# Patient Record
Sex: FEMALE | Race: WHITE | NOT HISPANIC OR LATINO | Employment: UNEMPLOYED | URBAN - NONMETROPOLITAN AREA
[De-identification: names, ages, dates, MRNs, and addresses within clinical notes are randomized per-mention and may not be internally consistent; named-entity substitution may affect disease eponyms.]

---

## 2020-07-13 ENCOUNTER — OFFICE VISIT (OUTPATIENT)
Dept: CARDIOLOGY | Facility: CLINIC | Age: 35
End: 2020-07-13

## 2020-07-13 VITALS
TEMPERATURE: 98 F | HEART RATE: 58 BPM | BODY MASS INDEX: 40.27 KG/M2 | WEIGHT: 174 LBS | HEIGHT: 55 IN | SYSTOLIC BLOOD PRESSURE: 98 MMHG | DIASTOLIC BLOOD PRESSURE: 60 MMHG

## 2020-07-13 DIAGNOSIS — R01.1 MURMUR, CARDIAC: ICD-10-CM

## 2020-07-13 DIAGNOSIS — E03.9 HYPOTHYROIDISM, UNSPECIFIED TYPE: ICD-10-CM

## 2020-07-13 DIAGNOSIS — R07.2 PRECORDIAL PAIN: ICD-10-CM

## 2020-07-13 DIAGNOSIS — G47.30 SLEEP APNEA IN ADULT: ICD-10-CM

## 2020-07-13 DIAGNOSIS — R06.02 SHORTNESS OF BREATH: Primary | ICD-10-CM

## 2020-07-13 DIAGNOSIS — E66.01 MORBIDLY OBESE (HCC): ICD-10-CM

## 2020-07-13 PROCEDURE — 99204 OFFICE O/P NEW MOD 45 MIN: CPT | Performed by: INTERNAL MEDICINE

## 2020-07-13 PROCEDURE — 93000 ELECTROCARDIOGRAM COMPLETE: CPT | Performed by: INTERNAL MEDICINE

## 2020-07-13 RX ORDER — MONTELUKAST SODIUM 10 MG/1
10 TABLET ORAL NIGHTLY
COMMUNITY
End: 2022-01-27

## 2020-07-13 RX ORDER — CLONAZEPAM 0.5 MG/1
0.5 TABLET ORAL AS NEEDED
COMMUNITY
End: 2022-01-27

## 2020-07-13 RX ORDER — QUETIAPINE FUMARATE 25 MG/1
TABLET, FILM COATED ORAL
COMMUNITY
End: 2022-01-27

## 2020-07-13 RX ORDER — LORATADINE 10 MG/1
CAPSULE, LIQUID FILLED ORAL DAILY
COMMUNITY
End: 2021-01-19

## 2020-07-13 RX ORDER — CHOLECALCIFEROL (VITAMIN D3) 125 MCG
CAPSULE ORAL
COMMUNITY
End: 2022-01-27

## 2020-07-13 RX ORDER — ERGOCALCIFEROL 1.25 MG/1
50000 CAPSULE ORAL WEEKLY
COMMUNITY
End: 2022-01-27

## 2020-07-13 RX ORDER — FLUOXETINE 10 MG/1
10 CAPSULE ORAL DAILY
COMMUNITY
End: 2022-01-27

## 2020-07-13 RX ORDER — LEVOTHYROXINE SODIUM 0.1 MG/1
100 TABLET ORAL DAILY
COMMUNITY

## 2020-07-13 NOTE — PROGRESS NOTES
Chief Complaint   Patient presents with   • Establish Care     re-establishing care, last seen here in 2008, heart murmur, chest pain, SOB   • Chest Pain     with exertion, mother reports walking about a mile several times a week, pt reports chest pain and SOB.  Symptoms resolve after resting.    • Shortness of Breath     with exertion   • Cardiac history     echo here in 2008, will be in door.    • Labs     most recent from June in chart   • Weight Gain     mother concerned about her weight gain of 15 pounds, is on a diet and has increased her exercise. Has lost 2 lbs over the past month.    • Sleep Apnea     was diagnosed several years ago, mother reports she complains of chest pain every time she tries to wear the CPAP.    • Aspirin     does not take a daily aspirin        CARDIAC COMPLAINTS  chest pressure/discomfort, dyspnea and fatigue      Subjective   Ryann Ortega is a 34 y.o. female came in today with the family.  She has history of Down syndrome whom I seen in 2003 and later in 2008.  The first time I saw her for weakness and shortness of breath associated with some dizziness and lightheadedness.  Her echocardiogram showed normal LV function, possible bicuspid aortic valve and intermittent prolapse of the mitral valve.  At that time she did have some episodes of syncope and felt it was related to Topamax and stopped taking the medication.  She has not had any more syncope.  She had symptoms of sleep apnea at that time and I checked her nocturnal pulse PO2 measurement.  She was diagnosed with sleep apnea but she was not able to tolerate the CPAP machine.  She is now referred back after 12 years because of increasing chest pain and shortness of breath.  According to the mother the try to walk about a mile a day.  She now started reporting chest pain during the walk and she has to sit down or rest for few minutes before the symptoms get better.  She is not able to describe the chest pain.  She does point to  her chest and apparently it does radiate to the left shoulder.  She also started noticing increasing shortness of breath during that time.  She also has been gaining weight and apparently gained about 15 pounds in the last few months.  Since I have seen she has been diagnosed with thyroid problem and takes thyroid supplements.  She did undergo some lab work in June and found to have a cholesterol of 154 with the LDL of 83.  Her blood sugar was normal with normal electrolytes.  Her TSH was normal at 1.21.  Her blood count was within normal limits.  She has no history of smoking.  Her brother has some kind of heart disease.  Hypertension hypercholesterolemia does run in the family.    Past Surgical History:   Procedure Laterality Date   • ECHO - CONVERTED  04/15/2008    EF 60%. Bicuspid AV, Redudant MV, Mild MR. RVSP- 35 mmHg.       Current Outpatient Medications   Medication Sig Dispense Refill   • clonazePAM (KlonoPIN) 0.5 MG tablet Take 0.5 mg by mouth As Needed for Seizures.     • FLUoxetine (PROzac) 10 MG capsule Take 10 mg by mouth Daily.     • levothyroxine (SYNTHROID, LEVOTHROID) 100 MCG tablet Take 100 mcg by mouth Daily.     • Loratadine 10 MG capsule Take  by mouth Daily.     • melatonin 5 MG tablet tablet 2 tabs at night     • montelukast (SINGULAIR) 10 MG tablet Take 10 mg by mouth Every Night.     • QUEtiapine (SEROquel) 25 MG tablet 1/2 to 1 tab nightly prn     • vitamin D (ERGOCALCIFEROL) 1.25 MG (18780 UT) capsule capsule Take 50,000 Units by mouth 1 (One) Time Per Week.       No current facility-administered medications for this visit.            ALLERGIES:  Patient has no known allergies.    Past Medical History:   Diagnosis Date   • Anxiety and depression    • Asthma    • Down's syndrome    • Femur fracture, left (CMS/HCC)     s/p surgical repair   • Heart murmur    • History of tonsillectomy and adenoidectomy    • Hypothyroidism    • Seasonal allergies    • Sleep apnea     noncompliant wearing  "mask   • Vitamin D deficiency        Social History     Tobacco Use   Smoking Status Never Smoker   Smokeless Tobacco Never Used          Family History   Problem Relation Age of Onset   • Stroke Mother    • Hypertension Mother    • Hyperlipidemia Mother    • Rheum arthritis Father    • Hyperlipidemia Father    • Hypertension Father    • Hypertension Brother    • Hypothyroidism Brother    • Other Maternal Grandmother          at 20 years old with pneumonia   • Heart disease Maternal Grandfather    • Hyperlipidemia Maternal Grandfather    • Hypertension Maternal Grandfather    • Diabetes Maternal Grandfather    • Diabetes Paternal Grandmother    • Other Paternal Grandfather         history unknown   • Heart disease Brother        Review of Systems   Constitution: Positive for malaise/fatigue and weight gain. Negative for decreased appetite.   HENT: Negative for congestion and sore throat.    Eyes: Negative for blurred vision.   Cardiovascular: Positive for chest pain and dyspnea on exertion.   Respiratory: Positive for shortness of breath and snoring.    Endocrine: Negative for cold intolerance and heat intolerance.   Hematologic/Lymphatic: Negative for adenopathy. Does not bruise/bleed easily.   Skin: Negative for itching, nail changes and skin cancer.   Musculoskeletal: Negative for arthritis and myalgias.   Gastrointestinal: Negative for abdominal pain, dysphagia and heartburn.   Genitourinary: Negative for bladder incontinence and frequency.   Neurological: Negative for dizziness, light-headedness, seizures and vertigo.   Psychiatric/Behavioral: Negative for altered mental status.   Allergic/Immunologic: Negative for environmental allergies and hives.       Diabetes- No  Thyroid- abnormal    Objective     BP 98/60 (BP Location: Left arm)   Pulse 58   Temp 98 °F (36.7 °C)   Ht 139.7 cm (55\")   Wt 78.9 kg (174 lb)   BMI 40.44 kg/m²     Physical Exam   Constitutional: She is oriented to person, place, and " time. She appears well-developed and well-nourished.   HENT:   Head: Normocephalic.   Nose: Nose normal.   Eyes: Pupils are equal, round, and reactive to light. EOM are normal.   Neck: Normal range of motion. Neck supple.   Cardiovascular: Normal rate, regular rhythm, S1 normal and S2 normal.   Murmur heard.  Pulmonary/Chest: Effort normal and breath sounds normal.   Abdominal: Soft. Bowel sounds are normal.   Musculoskeletal: Normal range of motion. She exhibits edema.   Neurological: She is alert and oriented to person, place, and time.   Skin: Skin is warm and dry.   Psychiatric: She has a normal mood and affect.         ECG 12 Lead  Date/Time: 7/13/2020 12:59 PM  Performed by: Bandar Valdes MD  Authorized by: Bandar Valdes MD   Comparison: compared with previous ECG from 5/8/2008  Comparison to previous ECG: Smaller Qrs  Rhythm: sinus rhythm  Rate: normal  QRS axis: normal  Other findings: non-specific ST-T wave changes and low voltage    Clinical impression: non-specific ECG              Assessment/Plan   Patient's Body mass index is 40.44 kg/m². BMI is above normal parameters. Recommendations include: educational material, exercise counseling and nutrition counseling.     Ryann was seen today for establish care, chest pain, shortness of breath, cardiac history, labs, weight gain, sleep apnea and aspirin.    Diagnoses and all orders for this visit:    Shortness of breath  -     Adult Transthoracic Echo Complete W/ Cont if Necessary Per Protocol; Future    Precordial pain  -     Stress Test With Myocardial Perfusion One Day; Future    Morbidly obese (CMS/HCC)    Hypothyroidism, unspecified type    Murmur, cardiac    Sleep apnea in adult  -     Overnight Sleep Oximetry Study; Future    At baseline she is slightly bradycardic and mildly hypotensive.  Her EKG done today shows sinus bradycardia, small QRS complex, nonspecific ST-T changes.  Her clinical examination reveals a BMI of 40.  She does have  normal heart sounds, questionable ejection click and a short systolic murmur at the mitral as well as at the aortic area.  She has normal peripheral pulse and trace pedal edema.    Her shortness of breath which is the main complaint could be related to her lungs or due to her obesity itself.  Given her history of valvular heart disease, I cannot rule out cardiac cause.  I scheduled her to undergo an echocardiogram to evaluate the LV function, valvular structures, PA pressure and also to rule out any pericardial effusion    Regarding the chest pain, it is little difficult to obtain a good history from her.  She does have few risk factor for CAD.  I did schedule her to undergo a stress test.  Since she is not able to walk much, I scheduled it as a Lexiscan    Regarding the obesity, I had a long talk with the mother about the diet.  I talked to her about cutting down on the carbohydrate intake.  I gave her papers on Mediterranean diet    Regarding the hypothyroidism, which has been newly diagnosed, it appears to be very well controlled with supplements so we will continue the same    Regarding the sleep apnea, I would like to recheck her oxygen again.  Even if she is not able to tolerate the CPAP, if there is significant hypoxia she might benefit with nocturnal PO2 supplements    Based on the results, further recommendations will be made.               Electronically signed by Bandar Valdes MD July 13, 2020 12:39

## 2020-07-13 NOTE — PATIENT INSTRUCTIONS
Mediterranean Diet  A Mediterranean diet refers to food and lifestyle choices that are based on the traditions of countries located on the [place]. This way of eating has been shown to help prevent certain conditions and improve outcomes for people who have chronic diseases, like kidney disease and heart disease.  What are tips for following this plan?  Lifestyle  · Cook and eat meals together with your family, when possible.  · Drink enough fluid to keep your urine clear or pale yellow.  · Be physically active every day. This includes:  ? Aerobic exercise like running or swimming.  ? Leisure activities like gardening, walking, or housework.  · Get 7-8 hours of sleep each night.  · If recommended by your health care provider, drink red wine in moderation. This means 1 glass a day for nonpregnant women and 2 glasses a day for men. A glass of wine equals 5 oz (150 mL).  Reading food labels    · Check the serving size of packaged foods. For foods such as rice and pasta, the serving size refers to the amount of cooked product, not dry.  · Check the total fat in packaged foods. Avoid foods that have saturated fat or trans fats.  · Check the ingredients list for added sugars, such as corn syrup.  Shopping  · At the grocery store, buy most of your food from the areas near the walls of the store. This includes:  ? Fresh fruits and vegetables (produce).  ? Grains, beans, nuts, and seeds. Some of these may be available in unpackaged forms or large amounts (in bulk).  ? Fresh seafood.  ? Poultry and eggs.  ? Low-fat dairy products.  · Buy whole ingredients instead of prepackaged foods.  · Buy fresh fruits and vegetables in-season from local farmers markets.  · Buy frozen fruits and vegetables in resealable bags.  · If you do not have access to quality fresh seafood, buy precooked frozen shrimp or canned fish, such as tuna, salmon, or sardines.  · Buy small amounts of raw or cooked vegetables, salads, or olives from  the deli or salad bar at your store.  · Stock your pantry so you always have certain foods on hand, such as olive oil, canned tuna, canned tomatoes, rice, pasta, and beans.  Cooking  · Cook foods with extra-virgin olive oil instead of using butter or other vegetable oils.  · Have meat as a side dish, and have vegetables or grains as your main dish. This means having meat in small portions or adding small amounts of meat to foods like pasta or stew.  · Use beans or vegetables instead of meat in common dishes like chili or lasagna.  · DISH with different cooking methods. Try roasting or broiling vegetables instead of steaming or sautéeing them.  · Add frozen vegetables to soups, stews, pasta, or rice.  · Add nuts or seeds for added healthy fat at each meal. You can add these to yogurt, salads, or vegetable dishes.  · Marinate fish or vegetables using olive oil, lemon juice, garlic, and fresh herbs.  Meal planning    · Plan to eat 1 vegetarian meal one day each week. Try to work up to 2 vegetarian meals, if possible.  · Eat seafood 2 or more times a week.  · Have healthy snacks readily available, such as:  ? Vegetable sticks with hummus.  ? Greek yogurt.  ? Fruit and nut trail mix.  · Eat balanced meals throughout the week. This includes:  ? Fruit: 2-3 servings a day  ? Vegetables: 4-5 servings a day  ? Low-fat dairy: 2 servings a day  ? Fish, poultry, or lean meat: 1 serving a day  ? Beans and legumes: 2 or more servings a week  ? Nuts and seeds: 1-2 servings a day  ? Whole grains: 6-8 servings a day  ? Extra-virgin olive oil: 3-4 servings a day  · Limit red meat and sweets to only a few servings a month  What are my food choices?  · Mediterranean diet  ? Recommended  § Grains: Whole-grain pasta. Brown rice. Bulgar wheat. Polenta. Couscous. Whole-wheat bread. Oatmeal. Quinoa.  § Vegetables: Artichokes. Beets. Broccoli. Cabbage. Carrots. Eggplant. Green beans. Chard. Kale. Spinach. Onions. Leeks. Peas. Squash.  Tomatoes. Peppers. Radishes.  § Fruits: Apples. Apricots. Avocado. Berries. Bananas. Cherries. Dates. Figs. Grapes. Rojas. Melon. Oranges. Peaches. Plums. Pomegranate.  § Meats and other protein foods: Beans. Almonds. Sunflower seeds. Pine nuts. Peanuts. Cod. [place]. Scallops. Shrimp. Tuna. Tilapia. Clams. Oysters. Eggs.  § Dairy: Low-fat milk. Cheese. Greek yogurt.  § Beverages: Water. Red wine. Herbal tea.  § Fats and oils: Extra virgin olive oil. Avocado oil. Grape seed oil.  § Sweets and desserts: Greek yogurt with honey. Baked apples. Poached pears. Trail mix.  § Seasoning and other foods: Basil. Cilantro. Coriander. Cumin. Mint. Parsley. Brandan. Rosemary. Tarragon. Garlic. Oregano. Thyme. Pepper. Balsalmic vinegar. Tahini. Hummus. Tomato sauce. Olives. Mushrooms.  ? Limit these  § Grains: Prepackaged pasta or rice dishes. Prepackaged cereal with added sugar.  § Vegetables: Deep fried potatoes (french fries).  § Fruits: Fruit canned in syrup.  § Meats and other protein foods: Beef. Pork. Lamb. Poultry with skin. Hot dogs. Dash.  § Dairy: Ice cream. Sour cream. Whole milk.  § Beverages: Juice. Sugar-sweetened soft drinks. Beer. Liquor and spirits.  § Fats and oils: Butter. Canola oil. Vegetable oil. Beef fat (tallow). Lard.  § Sweets and desserts: Cookies. Cakes. Pies. Candy.  § Seasoning and other foods: Mayonnaise. Premade sauces and marinades.  The items listed may not be a complete list. Talk with your dietitian about what dietary choices are right for you.  Summary  · The Mediterranean diet includes both food and lifestyle choices.  · Eat a variety of fresh fruits and vegetables, beans, nuts, seeds, and whole grains.  · Limit the amount of red meat and sweets that you eat.  · Talk with your health care provider about whether it is safe for you to drink red wine in moderation. This means 1 glass a day for nonpregnant women and 2 glasses a day for men. A glass of wine equals 5 oz (150 mL).  This information  is not intended to replace advice given to you by your health care provider. Make sure you discuss any questions you have with your health care provider.  Document Released: 08/10/2017 Document Revised: 08/17/2017 Document Reviewed: 08/10/2017  Elsevier Patient Education © 2020 [employer]

## 2020-07-14 ENCOUNTER — HOSPITAL ENCOUNTER (OUTPATIENT)
Dept: CARDIOLOGY | Facility: HOSPITAL | Age: 35
Discharge: HOME | End: 2020-07-14

## 2020-07-14 VITALS — WEIGHT: 173.94 LBS | BODY MASS INDEX: 40.26 KG/M2 | HEIGHT: 55 IN

## 2020-07-14 DIAGNOSIS — R06.02 SHORTNESS OF BREATH: ICD-10-CM

## 2020-07-14 DIAGNOSIS — R07.2 PRECORDIAL PAIN: ICD-10-CM

## 2020-07-14 LAB
AORTIC DIMENSIONLESS INDEX: 0.8 (DI)
BH CV ECHO MEAS - AO MAX PG (FULL): 1.7 MMHG
BH CV ECHO MEAS - AO MAX PG: 4 MMHG
BH CV ECHO MEAS - AO MEAN PG (FULL): 1 MMHG
BH CV ECHO MEAS - AO MEAN PG: 2 MMHG
BH CV ECHO MEAS - AO ROOT AREA (BSA CORRECTED): 1.7
BH CV ECHO MEAS - AO ROOT AREA: 6.2 CM^2
BH CV ECHO MEAS - AO ROOT DIAM: 2.8 CM
BH CV ECHO MEAS - AO V2 MAX: 100 CM/SEC
BH CV ECHO MEAS - AO V2 MEAN: 67.1 CM/SEC
BH CV ECHO MEAS - AO V2 VTI: 24.4 CM
BH CV ECHO MEAS - BSA(HAYCOCK): 1.8 M^2
BH CV ECHO MEAS - BSA: 1.7 M^2
BH CV ECHO MEAS - BZI_BMI: 39 KILOGRAMS/M^2
BH CV ECHO MEAS - BZI_METRIC_HEIGHT: 142.2 CM
BH CV ECHO MEAS - BZI_METRIC_WEIGHT: 78.9 KG
BH CV ECHO MEAS - EDV(CUBED): 78.4 ML
BH CV ECHO MEAS - EDV(TEICH): 82.2 ML
BH CV ECHO MEAS - EF(CUBED): 66.9 %
BH CV ECHO MEAS - EF(TEICH): 58.8 %
BH CV ECHO MEAS - ESV(CUBED): 25.9 ML
BH CV ECHO MEAS - ESV(TEICH): 33.9 ML
BH CV ECHO MEAS - FS: 30.8 %
BH CV ECHO MEAS - IVS/LVPW: 1.3
BH CV ECHO MEAS - IVSD: 0.9 CM
BH CV ECHO MEAS - LA DIMENSION: 3.2 CM
BH CV ECHO MEAS - LA/AO: 1.1
BH CV ECHO MEAS - LAT PEAK E' VEL: 13.7 CM/SEC
BH CV ECHO MEAS - LV IVRT: 0.1 SEC
BH CV ECHO MEAS - LV MASS(C)D: 105.3 GRAMS
BH CV ECHO MEAS - LV MASS(C)DI: 62.9 GRAMS/M^2
BH CV ECHO MEAS - LV MAX PG: 2.3 MMHG
BH CV ECHO MEAS - LV MEAN PG: 1 MMHG
BH CV ECHO MEAS - LV V1 MAX: 75.7 CM/SEC
BH CV ECHO MEAS - LV V1 MEAN: 48.5 CM/SEC
BH CV ECHO MEAS - LV V1 VTI: 18.5 CM
BH CV ECHO MEAS - LVIDD: 4.3 CM
BH CV ECHO MEAS - LVIDS: 3 CM
BH CV ECHO MEAS - LVPWD: 0.71 CM
BH CV ECHO MEAS - MED PEAK E' VEL: 9.6 CM/SEC
BH CV ECHO MEAS - MV A MAX VEL: 51.8 CM/SEC
BH CV ECHO MEAS - MV DEC SLOPE: 326 CM/SEC^2
BH CV ECHO MEAS - MV DEC TIME: 0.26 SEC
BH CV ECHO MEAS - MV E MAX VEL: 93.9 CM/SEC
BH CV ECHO MEAS - MV E/A: 1.8
BH CV ECHO MEAS - MV MAX PG: 3.9 MMHG
BH CV ECHO MEAS - MV MEAN PG: 1 MMHG
BH CV ECHO MEAS - MV P1/2T MAX VEL: 91.8 CM/SEC
BH CV ECHO MEAS - MV P1/2T: 82.5 MSEC
BH CV ECHO MEAS - MV V2 MAX: 99 CM/SEC
BH CV ECHO MEAS - MV V2 MEAN: 43.5 CM/SEC
BH CV ECHO MEAS - MV V2 VTI: 34 CM
BH CV ECHO MEAS - MVA P1/2T LCG: 2.4 CM^2
BH CV ECHO MEAS - MVA(P1/2T): 2.7 CM^2
BH CV ECHO MEAS - PA MAX PG (FULL): 1.7 MMHG
BH CV ECHO MEAS - PA MAX PG: 3.9 MMHG
BH CV ECHO MEAS - PA MEAN PG (FULL): 1 MMHG
BH CV ECHO MEAS - PA MEAN PG: 2 MMHG
BH CV ECHO MEAS - PA V2 MAX: 99 CM/SEC
BH CV ECHO MEAS - PA V2 MEAN: 68.5 CM/SEC
BH CV ECHO MEAS - PA V2 VTI: 20.9 CM
BH CV ECHO MEAS - RV MAX PG: 2.2 MMHG
BH CV ECHO MEAS - RV MEAN PG: 1 MMHG
BH CV ECHO MEAS - RV V1 MAX: 73.8 CM/SEC
BH CV ECHO MEAS - RV V1 MEAN: 51.9 CM/SEC
BH CV ECHO MEAS - RV V1 VTI: 19.1 CM
BH CV ECHO MEAS - RVDD: 2.1 CM
BH CV ECHO MEAS - SI(AO): 89.8 ML/M^2
BH CV ECHO MEAS - SI(CUBED): 31.4 ML/M^2
BH CV ECHO MEAS - SI(TEICH): 28.9 ML/M^2
BH CV ECHO MEAS - SV(AO): 150.2 ML
BH CV ECHO MEAS - SV(CUBED): 52.5 ML
BH CV ECHO MEAS - SV(TEICH): 48.3 ML
BH CV ECHO MEASUREMENTS AVERAGE E/E' RATIO: 8.06
BH CV NUCLEAR PRIOR STUDY: 2
BH CV STRESS COMMENTS STAGE 1: NORMAL
BH CV STRESS DOSE REGADENOSON STAGE 1: 0.4
BH CV STRESS DURATION MIN STAGE 1: 0
BH CV STRESS DURATION SEC STAGE 1: 10
BH CV STRESS PROTOCOL 1: NORMAL
BH CV STRESS RECOVERY BP: NORMAL MMHG
BH CV STRESS RECOVERY HR: 111 BPM
BH CV STRESS STAGE 1: 1
LV EF NUC BP: 51 %
MAXIMAL PREDICTED HEART RATE: 188 BPM
MAXIMAL PREDICTED HEART RATE: 188 BPM
PERCENT MAX PREDICTED HR: 69.15 %
STRESS BASELINE BP: NORMAL MMHG
STRESS BASELINE HR: 76 BPM
STRESS PERCENT HR: 81 %
STRESS POST PEAK BP: NORMAL MMHG
STRESS POST PEAK HR: 130 BPM
STRESS TARGET HR: 160 BPM
STRESS TARGET HR: 160 BPM

## 2020-07-14 PROCEDURE — 25010000002 REGADENOSON 0.4 MG/5ML SOLUTION: Performed by: INTERNAL MEDICINE

## 2020-07-14 PROCEDURE — 0 TECHNETIUM SESTAMIBI: Performed by: INTERNAL MEDICINE

## 2020-07-14 PROCEDURE — 93306 TTE W/DOPPLER COMPLETE: CPT

## 2020-07-14 PROCEDURE — 93016 CV STRESS TEST SUPVJ ONLY: CPT | Performed by: NURSE PRACTITIONER

## 2020-07-14 PROCEDURE — 93306 TTE W/DOPPLER COMPLETE: CPT | Performed by: INTERNAL MEDICINE

## 2020-07-14 PROCEDURE — 93018 CV STRESS TEST I&R ONLY: CPT | Performed by: INTERNAL MEDICINE

## 2020-07-14 PROCEDURE — 93356 MYOCRD STRAIN IMG SPCKL TRCK: CPT | Performed by: INTERNAL MEDICINE

## 2020-07-14 PROCEDURE — A9500 TC99M SESTAMIBI: HCPCS | Performed by: INTERNAL MEDICINE

## 2020-07-14 PROCEDURE — 78452 HT MUSCLE IMAGE SPECT MULT: CPT

## 2020-07-14 PROCEDURE — 93017 CV STRESS TEST TRACING ONLY: CPT

## 2020-07-14 PROCEDURE — 78452 HT MUSCLE IMAGE SPECT MULT: CPT | Performed by: INTERNAL MEDICINE

## 2020-07-14 RX ADMIN — TECHNETIUM TC 99M SESTAMIBI 1 DOSE: 1 INJECTION INTRAVENOUS at 08:59

## 2020-07-14 RX ADMIN — REGADENOSON 0.4 MG: 0.08 INJECTION, SOLUTION INTRAVENOUS at 12:00

## 2020-07-14 RX ADMIN — TECHNETIUM TC 99M SESTAMIBI 1 DOSE: 1 INJECTION INTRAVENOUS at 12:00

## 2021-01-19 ENCOUNTER — OFFICE VISIT (OUTPATIENT)
Dept: CARDIOLOGY | Facility: CLINIC | Age: 36
End: 2021-01-19

## 2021-01-19 VITALS
HEART RATE: 68 BPM | TEMPERATURE: 97.8 F | BODY MASS INDEX: 39.57 KG/M2 | WEIGHT: 171 LBS | SYSTOLIC BLOOD PRESSURE: 88 MMHG | DIASTOLIC BLOOD PRESSURE: 58 MMHG | HEIGHT: 55 IN

## 2021-01-19 DIAGNOSIS — E03.9 HYPOTHYROIDISM, UNSPECIFIED TYPE: Primary | ICD-10-CM

## 2021-01-19 DIAGNOSIS — E66.01 MORBIDLY OBESE (HCC): ICD-10-CM

## 2021-01-19 DIAGNOSIS — G47.30 SLEEP APNEA IN ADULT: ICD-10-CM

## 2021-01-19 DIAGNOSIS — R00.2 PALPITATIONS: ICD-10-CM

## 2021-01-19 PROCEDURE — 99213 OFFICE O/P EST LOW 20 MIN: CPT | Performed by: NURSE PRACTITIONER

## 2021-01-19 RX ORDER — CETIRIZINE HYDROCHLORIDE 10 MG/1
10 TABLET ORAL DAILY
COMMUNITY

## 2021-01-19 NOTE — PROGRESS NOTES
"Chief Complaint   Patient presents with   • Follow-up     Cardiac management.   • Lab     Last labs couple weeks ago per PCP. PCP writes refills on medications.   • Palpitations     Has occasional flutters, states \"I feel nervous at times\".   • Medication     She started taking [employer] healthy metabolism support a week ago, has lost 5 lbs.     Subjective       Ryann Ortega is a 35 y.o. female with Down Syndrome who was seen in 2003 and 2008 for weakness and shortness of breath.  Echocardiogram showed normal LV function and possible bicuspid AV and intermittent prolapse.  Topamax was stopped and she had no recurrent syncope.  Overnight oximetry was abnormal but she was unable to tolerate CPAP.  She was diagnosed with hypothyroidism which has been corrected.  She was referred back for evaluation in July 2020 for evaluation of increasing chest pain and shortness of breath.  She was trying to exercise and developed shortness of breath and chest tightness requiring her to stop and rest.  Labs showed normal cholesterol at 154, LDL 83, normal glucose, TSH 1.21, normal H/H.  She underwent cardiac work-up with Lexiscan stress showing breast attenuation without ischemia. Echo showed stable LV function, EF 60%, trace MR, no aortic stenosis.  The aortic valve was not well visualized.  Conservative management recommended.    She returns today for her follow-up visit with her mother.  She is pleasant, in no distress.  She denies chest pain or shortness of breath.  She does have occasional fluttering in her chest when she feels anxious or nervous.  No dizziness or syncope.  She is watching her diet and has lost a couple of pounds.         Cardiac History:    Past Surgical History:   Procedure Laterality Date   • CARDIOVASCULAR STRESS TEST  07/14/2020    Lexiscan Poor Quality. . Breast Attenuation.   • ECHO - CONVERTED  04/15/2008    EF 60%. Bicuspid AV, Redudant MV, Mild MR. RVSP- 35 mmHg.   • ECHO - CONVERTED  07/14/2020 "    TLS. EF 55%. Unable to see the AV. Trace-Mild MR.     Current Outpatient Medications   Medication Sig Dispense Refill   • cetirizine (zyrTEC) 10 MG tablet Take 10 mg by mouth Daily.     • clonazePAM (KlonoPIN) 0.5 MG tablet Take 0.5 mg by mouth As Needed for Seizures.     • FLUoxetine (PROzac) 10 MG capsule Take 10 mg by mouth Daily.     • levothyroxine (SYNTHROID, LEVOTHROID) 100 MCG tablet Take 100 mcg by mouth Daily.     • melatonin 5 MG tablet tablet 2 tabs at night     • montelukast (SINGULAIR) 10 MG tablet Take 10 mg by mouth Every Night.     • QUEtiapine (SEROquel) 25 MG tablet 1/2 to 1 tab nightly prn     • vitamin D (ERGOCALCIFEROL) 1.25 MG (72344 UT) capsule capsule Take 50,000 Units by mouth 1 (One) Time Per Week.       No current facility-administered medications for this visit.      Patient has no known allergies.    Past Medical History:   Diagnosis Date   • Anxiety and depression    • Asthma    • Down's syndrome    • Femur fracture, left (CMS/HCC)     s/p surgical repair   • Heart murmur    • History of tonsillectomy and adenoidectomy    • Hypothyroidism    • Seasonal allergies    • Sleep apnea     noncompliant wearing mask   • Vitamin D deficiency      Social History     Socioeconomic History   • Marital status: Single     Spouse name: Not on file   • Number of children: Not on file   • Years of education: Not on file   • Highest education level: Not on file   Tobacco Use   • Smoking status: Never Smoker   • Smokeless tobacco: Never Used   Substance and Sexual Activity   • Alcohol use: Never     Frequency: Never   • Drug use: Never     Family History   Problem Relation Age of Onset   • Stroke Mother    • Hypertension Mother    • Hyperlipidemia Mother    • Rheum arthritis Father    • Hyperlipidemia Father    • Hypertension Father    • Hypertension Brother    • Hypothyroidism Brother    • Other Maternal Grandmother          at 20 years old with pneumonia   • Heart disease Maternal Grandfather   "  • Hyperlipidemia Maternal Grandfather    • Hypertension Maternal Grandfather    • Diabetes Maternal Grandfather    • Diabetes Paternal Grandmother    • Other Paternal Grandfather         history unknown   • Heart disease Brother      Review of Systems   Constitution: Negative for decreased appetite and malaise/fatigue.   HENT: Negative.    Eyes: Negative for blurred vision.   Cardiovascular: Positive for palpitations. Negative for chest pain, dyspnea on exertion, leg swelling and syncope.   Respiratory: Negative for shortness of breath and sleep disturbances due to breathing.    Endocrine: Negative.    Hematologic/Lymphatic: Negative for bleeding problem. Does not bruise/bleed easily.   Skin: Negative.    Musculoskeletal: Negative for falls and myalgias.   Gastrointestinal: Negative for abdominal pain, heartburn and melena.   Genitourinary: Negative for hematuria.   Neurological: Negative for dizziness and light-headedness.   Psychiatric/Behavioral: Negative for altered mental status.   Allergic/Immunologic: Negative.       Objective     BP (!) 88/58 (BP Location: Right arm)   Pulse 68   Temp 97.8 °F (36.6 °C)   Ht 139 cm (54.72\")   Wt 77.6 kg (171 lb)   BMI 40.15 kg/m²     Vitals signs and nursing note reviewed.   Constitutional:       General: Not in acute distress.     Appearance: Well-developed. Not diaphoretic.   Eyes:      Pupils: Pupils are equal, round, and reactive to light.   HENT:      Head: Normocephalic.   Neck:      Musculoskeletal: Normal range of motion.   Pulmonary:      Effort: Pulmonary effort is normal. No respiratory distress.      Breath sounds: Normal breath sounds.   Cardiovascular:      Normal rate. Regular rhythm.      Murmurs: There is a grade 1/6 systolic murmur at the apex.   Pulses:     Intact distal pulses.   Edema:     Peripheral edema absent.   Abdominal:      General: Bowel sounds are normal.      Palpations: Abdomen is soft.   Musculoskeletal: Normal range of motion. "   Skin:     General: Skin is warm and dry.   Neurological:      Mental Status: Alert and oriented to person, place, and time.        Procedures          Problem List Items Addressed This Visit        Other    Morbidly obese (CMS/HCC)    Sleep apnea in adult    Hypothyroidism - Primary    Palpitations         Heart rate and blood pressure are stable.  She is mildly hypotensive which is not a new finding for her.  Encouraged to increase fluid intake.  Electrolyte drink may be helpful.  Rhythm is regular on today's exam.  We reviewed the findings of her stress test and echocardiogram showing no ischemia, normal LV function, stable valve function.  No arrhythmia noted on EKG strips.  Regarding the palpitations, they do not seem to be associated with dizziness or presyncope.  Recommend conservative management as beta-blocker would not be tolerated due to low blood pressure.  If symptoms persist, may need to wear a Holter monitor.  She was encouraged to continue weight loss efforts.  Regular exercise encouraged.  We will see her back for follow-up in 6 months or sooner if needed.    Patient's Body mass index is 40.15 kg/m². BMI is above normal parameters. Recommendations include: nutrition counseling.               Electronically signed by SNOW Lowery,  January 19, 2021 15:49 EST

## 2021-03-23 ENCOUNTER — BULK ORDERING (OUTPATIENT)
Dept: CASE MANAGEMENT | Facility: OTHER | Age: 36
End: 2021-03-23

## 2021-03-23 DIAGNOSIS — Z23 IMMUNIZATION DUE: ICD-10-CM

## 2021-07-26 ENCOUNTER — OFFICE VISIT (OUTPATIENT)
Dept: CARDIOLOGY | Facility: CLINIC | Age: 36
End: 2021-07-26

## 2021-07-26 VITALS
WEIGHT: 178 LBS | DIASTOLIC BLOOD PRESSURE: 68 MMHG | SYSTOLIC BLOOD PRESSURE: 116 MMHG | OXYGEN SATURATION: 98 % | RESPIRATION RATE: 16 BRPM | BODY MASS INDEX: 41.19 KG/M2 | HEART RATE: 100 BPM | HEIGHT: 55 IN

## 2021-07-26 DIAGNOSIS — R01.1 MURMUR, CARDIAC: Primary | ICD-10-CM

## 2021-07-26 DIAGNOSIS — G47.30 SLEEP APNEA IN ADULT: ICD-10-CM

## 2021-07-26 DIAGNOSIS — R00.2 PALPITATIONS: ICD-10-CM

## 2021-07-26 DIAGNOSIS — Q23.1 BICUSPID AORTIC VALVE: ICD-10-CM

## 2021-07-26 DIAGNOSIS — E03.9 HYPOTHYROIDISM, UNSPECIFIED TYPE: ICD-10-CM

## 2021-07-26 PROBLEM — Q23.81 BICUSPID AORTIC VALVE: Status: ACTIVE | Noted: 2021-07-26

## 2021-07-26 PROCEDURE — 99213 OFFICE O/P EST LOW 20 MIN: CPT | Performed by: NURSE PRACTITIONER

## 2021-07-26 NOTE — PROGRESS NOTES
"Chief Complaint   Patient presents with   • Follow-up     Cardiac management.  Pt does not take asa.  Last lab work done 2-3 weeks ago.  Managed by PCP.  Was told all labs \"looked good\".  No cardiac complaints.  She does report occasional \"butterflies\" in upper abdominal area.     • Med Refill     Pt did not bring medication list.  Reviewed verbally.  Refills provided by PCP     Subjective       Ryann Ortega is a 35 y.o. female with Down Syndrome who was seen in 2003 and 2008 for weakness and shortness of breath.  Echocardiogram showed normal LV function and possible bicuspid AV, intermittent prolapse.  Topamax was stopped and she had no recurrent syncope.  Overnight oximetry was abnormal but she was unable to tolerate CPAP.  She was diagnosed with hypothyroidism which has been corrected. She was referred back in July 2020 for increasing CP and SOB with exercise. Lexiscan showed no ischemia, normal LVEF. Echo showed no aortic stenosis but unable to fully visualize aortic valve.  Cholesterol normal at 154, LDL 83, normal glucose, TSH 1.21, normal H/H. Conservative management recommended.    She returns today for follow up with her mother. She denies chest pain or shortness of breath. No dizziness or witness presyncope or syncope. She occasionally has a fluttering feeling throughout the R and L upper abd quadrants with belching. She is trying to wean off seroquel secondary to weight gain and she has been stable. Labs Q3 months with PCP. Reported as well controlled.        Cardiac History:    Past Surgical History:   Procedure Laterality Date   • CARDIOVASCULAR STRESS TEST  07/14/2020    Lexiscan Poor Quality. . Breast Attenuation.   • ECHO - CONVERTED  04/15/2008    EF 60%. Bicuspid AV, Redudant MV, Mild MR. RVSP- 35 mmHg.   • ECHO - CONVERTED  07/14/2020    TLS. EF 55%. Unable to see the AV. Trace-Mild MR.     Current Outpatient Medications   Medication Sig Dispense Refill   • cetirizine (zyrTEC) 10 MG tablet " Take 10 mg by mouth Daily.     • levothyroxine (SYNTHROID, LEVOTHROID) 100 MCG tablet Take 100 mcg by mouth Daily.     • melatonin 5 MG tablet tablet 2 tabs at night     • montelukast (SINGULAIR) 10 MG tablet Take 10 mg by mouth Every Night.     • QUEtiapine (SEROquel) 25 MG tablet 1/2 to 1 tab nightly prn     • vitamin D (ERGOCALCIFEROL) 1.25 MG (13793 UT) capsule capsule Take 50,000 Units by mouth 1 (One) Time Per Week.     • clonazePAM (KlonoPIN) 0.5 MG tablet Take 0.5 mg by mouth As Needed for Seizures.     • FLUoxetine (PROzac) 10 MG capsule Take 10 mg by mouth Daily.       No current facility-administered medications for this visit.     Patient has no known allergies.    Past Medical History:   Diagnosis Date   • Anxiety and depression    • Asthma    • Down's syndrome    • Femur fracture, left (CMS/HCC)     s/p surgical repair   • Heart murmur    • History of tonsillectomy and adenoidectomy    • Hypothyroidism    • Seasonal allergies    • Sleep apnea     noncompliant wearing mask   • Vitamin D deficiency      Social History     Socioeconomic History   • Marital status: Single     Spouse name: Not on file   • Number of children: Not on file   • Years of education: Not on file   • Highest education level: Not on file   Tobacco Use   • Smoking status: Never Smoker   • Smokeless tobacco: Never Used   Substance and Sexual Activity   • Alcohol use: Never   • Drug use: Never     Family History   Problem Relation Age of Onset   • Stroke Mother    • Hypertension Mother    • Hyperlipidemia Mother    • Rheum arthritis Father    • Hyperlipidemia Father    • Hypertension Father    • Hypertension Brother    • Hypothyroidism Brother    • Other Maternal Grandmother          at 20 years old with pneumonia   • Heart disease Maternal Grandfather    • Hyperlipidemia Maternal Grandfather    • Hypertension Maternal Grandfather    • Diabetes Maternal Grandfather    • Diabetes Paternal Grandmother    • Other Paternal Grandfather   "       history unknown   • Heart disease Brother      Review of Systems   Constitutional: Positive for weight gain (up 7 lb). Negative for decreased appetite and malaise/fatigue.   HENT: Negative.    Eyes: Negative for blurred vision.   Cardiovascular: Positive for palpitations (appears to be more GI related). Negative for chest pain, dyspnea on exertion, leg swelling and syncope.   Respiratory: Negative for shortness of breath and sleep disturbances due to breathing.    Endocrine: Negative.    Hematologic/Lymphatic: Negative for bleeding problem. Does not bruise/bleed easily.   Skin: Negative.    Musculoskeletal: Negative for falls and myalgias.   Gastrointestinal: Negative for abdominal pain, heartburn and melena.   Genitourinary: Negative for hematuria.   Neurological: Negative for dizziness and light-headedness.   Psychiatric/Behavioral: Negative for altered mental status.   Allergic/Immunologic: Negative.       Objective     /68 (BP Location: Right arm, Patient Position: Sitting)   Pulse 100   Resp 16   Ht 139.7 cm (55\")   Wt 80.7 kg (178 lb)   SpO2 98%   BMI 41.37 kg/m²     Vitals and nursing note reviewed.   Constitutional:       General: Not in acute distress.     Appearance: Well-developed. Not diaphoretic.   Eyes:      Pupils: Pupils are equal, round, and reactive to light.   HENT:      Head: Normocephalic.   Pulmonary:      Effort: Pulmonary effort is normal. No respiratory distress.      Breath sounds: Normal breath sounds.   Cardiovascular:      Normal rate. Regular rhythm.   Pulses:     Intact distal pulses.   Edema:     Peripheral edema absent.   Abdominal:      General: Bowel sounds are normal.      Palpations: Abdomen is soft.   Musculoskeletal: Normal range of motion.      Cervical back: Normal range of motion. Skin:     General: Skin is warm and dry.   Neurological:      Mental Status: Alert and oriented to person, place, and time.        Procedures          Problem List Items " Addressed This Visit        Cardiac and Vasculature    Murmur, cardiac - Primary    Palpitations    Bicuspid aortic valve    Overview     Echocardiogram, 2008, bicuspid AV            Endocrine and Metabolic    Hypothyroidism       Sleep    Sleep apnea in adult         Heart rate and blood pressure well controlled. No ectopy noted on clinical exam. She has no dizziness or near syncope. Continue to push fluids, regular activity encouraged.     Questionable bicuspid aortic valve- recent echo in 2020 reviewed with her and her mother. Unable to well visualize AV. She has no aortic stenosis and no significant murmur noted today. Will plan to repeat echocardiogram next year for routine surveillance.     Labs in 2020 were normal. Followed closely by PCP.     Palpitations described appear to be more GI related, she can try TUMS or Pepcid. If she develops any symptoms of dizziness or pain with the palpitations, will place holter monitor. Can try eating smaller portions.    She appears stable. Follow up in six months.     Patient's Body mass index is 41.37 kg/m². indicating that she is obese (BMI >30). Obesity-related health conditions include the following: obstructive sleep apnea, hypertension, coronary heart disease, diabetes mellitus and dyslipidemias. Obesity is worsening. BMI is is above average; BMI management plan is completed.              Electronically signed by SNOW Lowery,  July 26, 2021 11:18 EDT

## 2022-01-27 ENCOUNTER — OFFICE VISIT (OUTPATIENT)
Dept: CARDIOLOGY | Facility: CLINIC | Age: 37
End: 2022-01-27

## 2022-01-27 VITALS
BODY MASS INDEX: 42.81 KG/M2 | TEMPERATURE: 98.3 F | WEIGHT: 185 LBS | HEIGHT: 55 IN | SYSTOLIC BLOOD PRESSURE: 112 MMHG | DIASTOLIC BLOOD PRESSURE: 60 MMHG | HEART RATE: 93 BPM

## 2022-01-27 DIAGNOSIS — Q23.1 BICUSPID AORTIC VALVE: ICD-10-CM

## 2022-01-27 DIAGNOSIS — R01.1 MURMUR, CARDIAC: ICD-10-CM

## 2022-01-27 DIAGNOSIS — E66.01 MORBIDLY OBESE: ICD-10-CM

## 2022-01-27 DIAGNOSIS — R00.2 PALPITATIONS: Primary | ICD-10-CM

## 2022-01-27 DIAGNOSIS — K21.9 CHEST PAIN DUE TO GERD: ICD-10-CM

## 2022-01-27 DIAGNOSIS — R07.9 CHEST PAIN DUE TO GERD: ICD-10-CM

## 2022-01-27 PROCEDURE — 99213 OFFICE O/P EST LOW 20 MIN: CPT | Performed by: NURSE PRACTITIONER

## 2022-01-27 RX ORDER — PROPRANOLOL HYDROCHLORIDE 10 MG/1
10 TABLET ORAL DAILY PRN
Qty: 30 TABLET | Refills: 8 | Status: SHIPPED | OUTPATIENT
Start: 2022-01-27 | End: 2022-09-13

## 2022-01-27 RX ORDER — CALCIUM CARBONATE 200(500)MG
1 TABLET,CHEWABLE ORAL DAILY
COMMUNITY

## 2022-01-27 RX ORDER — PANTOPRAZOLE SODIUM 40 MG/1
40 TABLET, DELAYED RELEASE ORAL DAILY
COMMUNITY

## 2022-01-27 RX ORDER — PHENOL 1.4 %
10 AEROSOL, SPRAY (ML) MUCOUS MEMBRANE NIGHTLY
COMMUNITY

## 2022-01-27 RX ORDER — DIPHENOXYLATE HYDROCHLORIDE AND ATROPINE SULFATE 2.5; .025 MG/1; MG/1
1 TABLET ORAL DAILY
COMMUNITY

## 2022-01-27 RX ORDER — HYDROXYZINE 50 MG/1
50 TABLET, FILM COATED ORAL NIGHTLY
COMMUNITY

## 2022-01-27 NOTE — PROGRESS NOTES
Chief Complaint   Patient presents with   • Follow-up     Pt is here for cardiac follow up.  She is joined in the room by her mother.  She denies CP, SOB, dizziness or palpitations.     • Med Refill     Pt request 30 day refills to be sent to [facility] in [place].   • Lab Work     Pt's last labs were a few days ago with her PCP.       Cardiac Complaints  palpitations      Subjective   Ryann Ortega is a 36 y.o. female with Down Syndrome who was seen in 2003 and 2008 for weakness and shortness of breath.  Echocardiogram showed normal LV function and possible bicuspid AV, intermittent prolapse.  Topamax was stopped and she had no recurrent syncope.  Overnight oximetry was abnormal but she was unable to tolerate CPAP.  She was diagnosed with hypothyroidism which has been corrected. She was referred back in July 2020 for increasing CP and SOB with exercise. Lexiscan showed no ischemia, normal LVEF. Echo showed no aortic stenosis but unable to fully visualize aortic valve.      She returns today for follow up accompanied by her mother. No CP, SOA, dizziness, or syncope noted. Mother does report she has been having some palpitations as of late and feels like her heart is racing. She did have a spell a few weeks ago with some chest tightness, and was started back on protonix, pain has subsided.  Labs recently done with PCP, no current available. Refills requested for 30 day supply.          Cardiac History  Past Surgical History:   Procedure Laterality Date   • CARDIOVASCULAR STRESS TEST  07/14/2020    Lexiscan Poor Quality. . Breast Attenuation.   • ECHO - CONVERTED  04/15/2008    EF 60%. Bicuspid AV, Redudant MV, Mild MR. RVSP- 35 mmHg.   • ECHO - CONVERTED  07/14/2020    TLS. EF 55%. Unable to see the AV. Trace-Mild MR.       Current Outpatient Medications   Medication Sig Dispense Refill   • calcium carbonate (TUMS) 500 MG chewable tablet Chew 1 tablet Daily.     • cetirizine (zyrTEC) 10 MG tablet Take 10 mg  by mouth Daily.     • hydrOXYzine (ATARAX) 50 MG tablet Take 50 mg by mouth Every Night.     • levothyroxine (SYNTHROID, LEVOTHROID) 100 MCG tablet Take 100 mcg by mouth Daily.     • Melatonin 10 MG tablet Take 10 mg by mouth Every Night.     • multivitamin (ONE-A-DAY ESSENTIAL PO) Take 1 tablet by mouth Daily.     • pantoprazole (PROTONIX) 40 MG EC tablet Take 40 mg by mouth Daily.     • propranolol (INDERAL) 10 MG tablet Take 1 tablet by mouth Daily As Needed (as needed for palpitations/tachycardia). 30 tablet 8     No current facility-administered medications for this visit.       Patient has no known allergies.    Past Medical History:   Diagnosis Date   • Anxiety and depression    • Asthma    • Down's syndrome    • Femur fracture, left (HCC)     s/p surgical repair   • GERD (gastroesophageal reflux disease)    • Heart murmur    • History of tonsillectomy and adenoidectomy    • Hypothyroidism    • Seasonal allergies    • Sleep apnea     noncompliant wearing mask   • Vitamin D deficiency        Social History     Socioeconomic History   • Marital status: Single   Tobacco Use   • Smoking status: Never Smoker   • Smokeless tobacco: Never Used   Vaping Use   • Vaping Use: Never used   Substance and Sexual Activity   • Alcohol use: Never   • Drug use: Never       Family History   Problem Relation Age of Onset   • Stroke Mother    • Hypertension Mother    • Hyperlipidemia Mother    • Rheum arthritis Father    • Hyperlipidemia Father    • Hypertension Father    • Hypertension Brother    • Hypothyroidism Brother    • Other Maternal Grandmother          at 20 years old with pneumonia   • Heart disease Maternal Grandfather    • Hyperlipidemia Maternal Grandfather    • Hypertension Maternal Grandfather    • Diabetes Maternal Grandfather    • Diabetes Paternal Grandmother    • Other Paternal Grandfather         history unknown   • Heart disease Brother        Review of Systems   Constitutional: Negative for  "malaise/fatigue and night sweats.   Cardiovascular: Positive for palpitations. Negative for chest pain, claudication, dyspnea on exertion, irregular heartbeat, leg swelling, near-syncope, orthopnea and syncope.   Respiratory: Negative for cough, shortness of breath and wheezing.    Musculoskeletal: Positive for stiffness. Negative for back pain and joint pain.   Gastrointestinal: Negative for anorexia, heartburn, melena, nausea and vomiting.   Genitourinary: Negative for dysuria, hematuria, hesitancy and nocturia.   Neurological: Negative for dizziness, light-headedness and loss of balance.   Psychiatric/Behavioral: Negative for depression and memory loss. The patient is not nervous/anxious.            Objective     /60 (BP Location: Left arm, Patient Position: Sitting)   Pulse 93   Temp 98.3 °F (36.8 °C)   Ht 139.7 cm (55\")   Wt 83.9 kg (185 lb)   BMI 43.00 kg/m²     Constitutional:       Appearance: Not in distress.   Eyes:      Pupils: Pupils are equal, round, and reactive to light.   HENT:      Nose: Nose normal.   Pulmonary:      Effort: Pulmonary effort is normal.      Breath sounds: Normal breath sounds.   Cardiovascular:      PMI at left midclavicular line. Normal rate. Regular rhythm.      Murmurs: There is a systolic murmur.   Abdominal:      Palpations: Abdomen is soft.   Musculoskeletal: Normal range of motion.      Cervical back: Normal range of motion and neck supple. Skin:     General: Skin is warm and dry.   Neurological:      Mental Status: Oriented to person, place and time.         Procedures    Assessment/Plan     Palpitations:  Noted rarely. HR remains slightly elevated. Inderal therapy will be recommended as needed basis. Limited caffeine intake advised. If palpitations should worsen, mother urged to call so monitor could be advised.    Bicuspid aortic valve:  Most recent echo showed 2020 showed no AS, but unable to visualize AV well. At next visit this summer 2022, repeat echo to " advised to assess.    GERD:  Protonix added back to current. She has not had any chest tightness since addition.     Sleep apnea:  Patient can not tolerate mask, it makes her feel like she suffocates.     BMI noted at 43.00, good cardiac ADA diet with limited carbs, calories, and activity as tolerated advised. She has quit sodas and was praised for her efforts.    6 month follow up advised or sooner if needed.         Problems Addressed this Visit        Cardiac and Vasculature    Murmur, cardiac    Palpitations - Primary    Bicuspid aortic valve    Relevant Medications    propranolol (INDERAL) 10 MG tablet       Endocrine and Metabolic    Morbidly obese (HCC)      Other Visit Diagnoses     Chest pain due to GERD        Relevant Medications    calcium carbonate (TUMS) 500 MG chewable tablet    pantoprazole (PROTONIX) 40 MG EC tablet      Diagnoses       Codes Comments    Palpitations    -  Primary ICD-10-CM: R00.2  ICD-9-CM: 785.1     Murmur, cardiac     ICD-10-CM: R01.1  ICD-9-CM: 785.2     Bicuspid aortic valve     ICD-10-CM: Q23.1  ICD-9-CM: 746.4     Chest pain due to GERD     ICD-10-CM: K21.9, R07.9  ICD-9-CM: 786.50, 530.81     Morbidly obese (HCC)     ICD-10-CM: E66.01  ICD-9-CM: 278.01           Patient's Body mass index is 43 kg/m². indicating that she is obese. Good cardiac diet with limited carbs, calories, and activity as tolerated advised.           Electronically signed by SNOW Huerta January 27, 2022 09:59 EST

## 2022-08-24 ENCOUNTER — OFFICE VISIT (OUTPATIENT)
Dept: CARDIOLOGY | Facility: CLINIC | Age: 37
End: 2022-08-24

## 2022-08-24 VITALS
SYSTOLIC BLOOD PRESSURE: 110 MMHG | HEIGHT: 55 IN | BODY MASS INDEX: 40.73 KG/M2 | DIASTOLIC BLOOD PRESSURE: 60 MMHG | WEIGHT: 176 LBS | HEART RATE: 78 BPM

## 2022-08-24 DIAGNOSIS — K21.9 GASTROESOPHAGEAL REFLUX DISEASE, UNSPECIFIED WHETHER ESOPHAGITIS PRESENT: ICD-10-CM

## 2022-08-24 DIAGNOSIS — E03.9 HYPOTHYROIDISM, UNSPECIFIED TYPE: ICD-10-CM

## 2022-08-24 DIAGNOSIS — E66.01 MORBIDLY OBESE: ICD-10-CM

## 2022-08-24 DIAGNOSIS — Q23.1 BICUSPID AORTIC VALVE: Primary | ICD-10-CM

## 2022-08-24 DIAGNOSIS — R00.2 PALPITATIONS: ICD-10-CM

## 2022-08-24 DIAGNOSIS — G47.30 SLEEP APNEA IN ADULT: ICD-10-CM

## 2022-08-24 PROCEDURE — 99214 OFFICE O/P EST MOD 30 MIN: CPT | Performed by: NURSE PRACTITIONER

## 2022-08-24 NOTE — PROGRESS NOTES
Chief Complaint   Patient presents with   • Follow-up     Cardiac management .  Has no cardiac complaints today   • LABS     Had labs done per Dr. Gonzáles  and PCP. Mom reports all were normal   • Med Refill     No refills needed today.  Had med list today       Subjective       Ryann Ortega is a 36 y.o. female with Down Syndrome who was seen in 2003 and 2008 for weakness and shortness of breath.  Echocardiogram showed normal LV function and possible bicuspid AV, intermittent prolapse.  Topamax was stopped and she had no recurrent syncope.  Overnight oximetry was abnormal but she was unable to tolerate CPAP.  She was diagnosed with hypothyroidism which has been corrected. She was referred back in July 2020 for increasing CP and SOB with exercise. Lexiscan showed no ischemia, normal LVEF. Echo showed no aortic stenosis but unable to fully visualize aortic valve.      At January 2022 office visit Inderal was added on a as needed basis for increased heart rate.    Today she returns the office for a follow-up visit accompanied by her mother.  According to patient and her mother she has not had cardiac symptoms of concern.  She did have some issues with GERD but after taking Protonix routinely and as needed dosing of Tums as well as making changes to her diet, heartburn has resolved.  Her weight is down about 10 pounds.  She no longer drinks kristine daily.  In regards to heart rate it has been better controlled and has not had to take as needed dose of Inderal.    Cardiac History:    Past Surgical History:   Procedure Laterality Date   • CARDIOVASCULAR STRESS TEST  07/14/2020    Lexiscan Poor Quality. . Breast Attenuation.   • ECHO - CONVERTED  04/15/2008    EF 60%. Bicuspid AV, Redudant MV, Mild MR. RVSP- 35 mmHg.   • ECHO - CONVERTED  07/14/2020    TLS. EF 55%. Unable to see the AV. Trace-Mild MR.       Current Outpatient Medications   Medication Sig Dispense Refill   • calcium carbonate (TUMS) 500 MG chewable  tablet Chew 1 tablet Daily.     • cetirizine (zyrTEC) 10 MG tablet Take 10 mg by mouth Daily.     • hydrOXYzine (ATARAX) 50 MG tablet Take 50 mg by mouth Every Night.     • levothyroxine (SYNTHROID, LEVOTHROID) 100 MCG tablet Take 100 mcg by mouth Daily.     • Melatonin 10 MG tablet Take 10 mg by mouth Every Night.     • multivitamin (THERAGRAN) tablet tablet Take 1 tablet by mouth Daily.     • pantoprazole (PROTONIX) 40 MG EC tablet Take 40 mg by mouth Daily.     • propranolol (INDERAL) 10 MG tablet Take 1 tablet by mouth Daily As Needed (as needed for palpitations/tachycardia). 30 tablet 8     No current facility-administered medications for this visit.       Patient has no known allergies.    Past Medical History:   Diagnosis Date   • Anxiety and depression    • Asthma    • Down's syndrome    • Femur fracture, left (HCC)     s/p surgical repair   • GERD (gastroesophageal reflux disease)    • Heart murmur    • History of tonsillectomy and adenoidectomy    • Hyperlipidemia    • Hypothyroidism    • Seasonal allergies    • Sleep apnea     noncompliant wearing mask   • Vitamin D deficiency        Social History     Socioeconomic History   • Marital status: Single   Tobacco Use   • Smoking status: Never Smoker   • Smokeless tobacco: Never Used   • Tobacco comment: Never Smoked Never Chewed   Vaping Use   • Vaping Use: Never used   Substance and Sexual Activity   • Alcohol use: Never   • Drug use: Never   • Sexual activity: Never       Family History   Problem Relation Age of Onset   • Stroke Mother    • Hypertension Mother         BP been under control for over a year so med lowered   • Hyperlipidemia Mother    • Clotting disorder Mother    • Heart disease Mother         Heart valve pumps to fast leaky valve   • Rheum arthritis Father    • Hyperlipidemia Father    • Hypertension Father    • Hypertension Brother         His jumps up and down   • Hypothyroidism Brother    • Other Maternal Grandmother          at 20  "years old with pneumonia   • Heart disease Maternal Grandfather    • Hyperlipidemia Maternal Grandfather            • Hypertension Maternal Grandfather            • Diabetes Maternal Grandfather    • Diabetes Paternal Grandmother    • Other Paternal Grandfather         history unknown   • Heart disease Brother    • Hypertension Brother         High blood pressure       Review of Systems   Constitutional: Positive for weight loss (intentional). Negative for decreased appetite, diaphoresis and malaise/fatigue.   HENT: Negative for nosebleeds.    Eyes: Negative for blurred vision.   Cardiovascular: Negative for chest pain, claudication, cyanosis, dyspnea on exertion, irregular heartbeat, leg swelling, near-syncope, orthopnea, palpitations, paroxysmal nocturnal dyspnea and syncope.   Respiratory: Negative for shortness of breath.    Endocrine: Negative for cold intolerance and heat intolerance.   Hematologic/Lymphatic: Negative for adenopathy. Does not bruise/bleed easily.   Skin: Negative for rash.   Musculoskeletal: Negative for myalgias.   Gastrointestinal: Positive for heartburn (tums prn beneficial). Negative for melena and nausea.   Genitourinary: Negative for dysuria and hematuria.   Neurological: Negative for dizziness and light-headedness.   Psychiatric/Behavioral: The patient does not have insomnia and is not nervous/anxious.         BP Readings from Last 5 Encounters:   22 110/60   22 112/60   21 116/68   21 (!) 88/58   20 98/60       Wt Readings from Last 5 Encounters:   22 79.8 kg (176 lb)   22 83.9 kg (185 lb)   21 80.7 kg (178 lb)   21 77.6 kg (171 lb)   20 78.9 kg (173 lb 15.1 oz)       Objective     /60 (BP Location: Left arm)   Pulse 78   Ht 139.7 cm (55\")   Wt 79.8 kg (176 lb)   BMI 40.91 kg/m²     Vitals and nursing note reviewed.   Eyes:      Pupils: Pupils are equal, round, and reactive to light.   HENT:      " Head: Normocephalic.   Neck:      Vascular: No carotid bruit.   Pulmonary:      Breath sounds: Normal breath sounds.   Cardiovascular:      Normal rate. Regular rhythm.      Murmurs: There is a grade 1 to 2/6 low frequency systolic murmur.   Pulses:     Intact distal pulses.   Edema:     Peripheral edema absent.   Abdominal:      General: Bowel sounds are normal.      Palpations: Abdomen is soft.   Musculoskeletal: Normal range of motion.      Cervical back: Normal range of motion. Skin:     General: Skin is warm.   Neurological:      Mental Status: Alert and oriented to person, place, and time.          Procedures: none today          Assessment & Plan   Diagnoses and all orders for this visit:    1. Bicuspid aortic valve (Primary)  -     Adult Transthoracic Echo Complete W/ Cont if Necessary Per Protocol; Future    2. Palpitations    3. Hypothyroidism, unspecified type    4. Morbidly obese (HCC)    5. Sleep apnea in adult    6. Gastroesophageal reflux disease, unspecified whether esophagitis present      Bicuspid aortic valve  - Most recent echo showed 2020 showed no AS, but unable to visualize AV well.  Echocardiogram ordered to relook at valvular structure.    Palpitations  -Heart rate and rhythm normal today.  - Remained better controlled since limiting caffeine/kristine in her diet.  - We will keep Inderal on a as needed basis.    Hypothyroidism  -On Synthroid.  PCP to manage.    Obesity/sleep apnea  -Her weight is down about 10 pounds.  We discussed weight loss is also beneficial for symptoms of sleep apnea.  She was unable to tolerate CPAP in the past.    GERD  - Continue PPI and as needed Tums.  -Informational handout on GERD diet provided.    Further recommendations per echocardiogram results.  A 6-month follow-up visit scheduled.  Please call sooner for cardiac concerns.

## 2022-09-06 ENCOUNTER — HOSPITAL ENCOUNTER (OUTPATIENT)
Dept: CARDIOLOGY | Facility: HOSPITAL | Age: 37
Discharge: HOME | End: 2022-09-06
Admitting: NURSE PRACTITIONER

## 2022-09-06 VITALS — HEIGHT: 55 IN | WEIGHT: 175.93 LBS | BODY MASS INDEX: 40.71 KG/M2

## 2022-09-06 DIAGNOSIS — Q23.1 BICUSPID AORTIC VALVE: ICD-10-CM

## 2022-09-06 LAB
AORTIC DIMENSIONLESS INDEX: 0.91 (DI)
BH CV ECHO MEAS - AO MAX PG: 3.9 MMHG
BH CV ECHO MEAS - AO MEAN PG: 2.01 MMHG
BH CV ECHO MEAS - AO ROOT DIAM: 2.39 CM
BH CV ECHO MEAS - AO V2 MAX: 98.5 CM/SEC
BH CV ECHO MEAS - AO V2 VTI: 20.7 CM
BH CV ECHO MEAS - EDV(CUBED): 78.3 ML
BH CV ECHO MEAS - EF(MOD-BP): 64 %
BH CV ECHO MEAS - ESV(CUBED): 21.8 ML
BH CV ECHO MEAS - FS: 34.7 %
BH CV ECHO MEAS - IVS/LVPW: 0.97 CM
BH CV ECHO MEAS - IVSD: 0.83 CM
BH CV ECHO MEAS - LA DIMENSION: 2.7 CM
BH CV ECHO MEAS - LAT PEAK E' VEL: 8.9 CM/SEC
BH CV ECHO MEAS - LV MASS(C)D: 111.7 GRAMS
BH CV ECHO MEAS - LV MAX PG: 3.3 MMHG
BH CV ECHO MEAS - LV MEAN PG: 1.71 MMHG
BH CV ECHO MEAS - LV V1 MAX: 90.3 CM/SEC
BH CV ECHO MEAS - LV V1 VTI: 20.3 CM
BH CV ECHO MEAS - LVIDD: 4.3 CM
BH CV ECHO MEAS - LVIDS: 2.8 CM
BH CV ECHO MEAS - LVPWD: 0.85 CM
BH CV ECHO MEAS - MED PEAK E' VEL: 7.6 CM/SEC
BH CV ECHO MEAS - MV A MAX VEL: 47 CM/SEC
BH CV ECHO MEAS - MV DEC SLOPE: 442.5 CM/SEC2
BH CV ECHO MEAS - MV DEC TIME: 0.21 MSEC
BH CV ECHO MEAS - MV E MAX VEL: 79.9 CM/SEC
BH CV ECHO MEAS - MV E/A: 1.7
BH CV ECHO MEAS - MV MAX PG: 3.7 MMHG
BH CV ECHO MEAS - MV MEAN PG: 1.42 MMHG
BH CV ECHO MEAS - MV P1/2T: 65.2 MSEC
BH CV ECHO MEAS - MV V2 VTI: 31.6 CM
BH CV ECHO MEAS - MVA(P1/2T): 3.4 CM2
BH CV ECHO MEAS - PA V2 MAX: 94.7 CM/SEC
BH CV ECHO MEAS - RV MAX PG: 2.05 MMHG
BH CV ECHO MEAS - RV V1 MAX: 71.7 CM/SEC
BH CV ECHO MEAS - RV V1 VTI: 15.5 CM
BH CV ECHO MEAS - RVDD: 2.03 CM
BH CV ECHO MEAS - TAPSE (>1.6): 2.43 CM
BH CV ECHO MEASUREMENTS AVERAGE E/E' RATIO: 9.68
BH CV XLRA - TDI S': 11.4 CM/SEC
MAXIMAL PREDICTED HEART RATE: 186 BPM
SINUS: 2.6 CM
STRESS TARGET HR: 158 BPM

## 2022-09-06 PROCEDURE — 93306 TTE W/DOPPLER COMPLETE: CPT

## 2022-09-06 PROCEDURE — 93306 TTE W/DOPPLER COMPLETE: CPT | Performed by: INTERNAL MEDICINE

## 2022-09-13 RX ORDER — PROPRANOLOL HYDROCHLORIDE 10 MG/1
TABLET ORAL
Qty: 30 TABLET | Refills: 8 | Status: SHIPPED | OUTPATIENT
Start: 2022-09-13

## 2023-06-14 ENCOUNTER — OFFICE VISIT (OUTPATIENT)
Dept: CARDIOLOGY | Facility: CLINIC | Age: 38
End: 2023-06-14
Payer: MEDICAID

## 2023-06-14 VITALS
DIASTOLIC BLOOD PRESSURE: 64 MMHG | WEIGHT: 169.6 LBS | BODY MASS INDEX: 39.25 KG/M2 | SYSTOLIC BLOOD PRESSURE: 100 MMHG | HEART RATE: 64 BPM | HEIGHT: 55 IN

## 2023-06-14 DIAGNOSIS — R00.2 PALPITATIONS: ICD-10-CM

## 2023-06-14 DIAGNOSIS — E03.9 HYPOTHYROIDISM, UNSPECIFIED TYPE: ICD-10-CM

## 2023-06-14 DIAGNOSIS — G47.30 SLEEP APNEA IN ADULT: ICD-10-CM

## 2023-06-14 DIAGNOSIS — Q23.1 BICUSPID AORTIC VALVE: Primary | ICD-10-CM

## 2023-06-14 RX ORDER — FOLIC ACID 1 MG/1
1 TABLET ORAL DAILY
COMMUNITY

## 2023-06-14 RX ORDER — LANOLIN ALCOHOL/MO/W.PET/CERES
1000 CREAM (GRAM) TOPICAL DAILY
COMMUNITY

## 2023-06-14 NOTE — PROGRESS NOTES
Chief Complaint   Patient presents with   • Follow-up     Cardiac management   • Lab     Last labs a week ago per PCP.   • Palpitations     Has occasional flutter, not often.   • Med Refill     Does not need refills at this time.   • Sleep apnea     Unable to tolerate CPAP.     Subjective       Ryann Ortega is a 37 y.o. female with Down Syndrome who was seen in 2003 and 2008 for weakness and shortness of breath.  Echocardiogram showed normal LV function and possible bicuspid AV, intermittent prolapse.  Topamax was stopped and she had no recurrent syncope.  Overnight oximetry was abnormal but she was unable to tolerate CPAP.  She was diagnosed with hypothyroidism which has been corrected. She was referred back in July 2020 for increasing CP and SOB with exercise. Lexiscan showed no ischemia, normal LVEF. Echo showed no aortic stenosis but unable to fully visualize aortic valve. At January 2022 office visit Inderal was added on a as needed basis for increased heart rate. Echo repeated 9/2022 with normal LVEF, AV not well visualized but no stenosis noted.     She returns today for follow up with her mother. According to the mother, she does not complain of chest pain, dyspnea or palpitations at home. Today, she tells the nurse she sometimes feels her heart skipping. Has not used Inderal which is PRN. She has lost 7 more pounds.        Cardiac History:    Past Surgical History:   Procedure Laterality Date   • CARDIOVASCULAR STRESS TEST  07/14/2020    Lexiscan Poor Quality. . Breast Attenuation.   • ECHO - CONVERTED  04/15/2008    EF 60%. Bicuspid AV, Redudant MV, Mild MR. RVSP- 35 mmHg.   • ECHO - CONVERTED  07/14/2020    TLS. EF 55%. Unable to see the AV. Trace-Mild MR.   • ECHO - CONVERTED  09/06/2022    TLS. EF 65%. Trace-Mild MR     Current Outpatient Medications   Medication Sig Dispense Refill   • APPLE CIDER VINEGAR PO Take  by mouth 2 (Two) Times a Day.     • calcium carbonate (TUMS) 500 MG chewable  tablet Chew 1 tablet Daily.     • cetirizine (zyrTEC) 10 MG tablet Take 1 tablet by mouth Daily.     • estradiol cypionate (DEPO-ESTRADIOL) 5 MG/ML injection Inject  into the appropriate muscle as directed by prescriber Every 28 (Twenty-Eight) Days.     • folic acid (FOLVITE) 1 MG tablet Take 1 tablet by mouth Daily.     • hydrOXYzine (ATARAX) 50 MG tablet Take 1 tablet by mouth Every Night.     • levothyroxine (SYNTHROID, LEVOTHROID) 100 MCG tablet Take 1 tablet by mouth Daily.     • Melatonin 10 MG tablet Take 1 tablet by mouth Every Night.     • multivitamin (THERAGRAN) tablet tablet Take 1 tablet by mouth Daily.     • pantoprazole (PROTONIX) 40 MG EC tablet Take 1 tablet by mouth Daily.     • propranolol (INDERAL) 10 MG tablet TAKE 1 TABLET BY MOUTH DAILY AS NEEDED FOR PALPITATIONS OR FAST HEART RATE 30 tablet 8   • vitamin B-12 (CYANOCOBALAMIN) 1000 MCG tablet Take 1 tablet by mouth Daily.       No current facility-administered medications for this visit.     Patient has no known allergies.    Past Medical History:   Diagnosis Date   • Anxiety and depression    • Asthma    • Down's syndrome    • Femur fracture, left     s/p surgical repair   • GERD (gastroesophageal reflux disease)    • Heart murmur    • History of tonsillectomy and adenoidectomy    • Hyperlipidemia    • Hypothyroidism    • Seasonal allergies    • Sleep apnea     noncompliant wearing mask   • Vitamin D deficiency      Social History     Socioeconomic History   • Marital status: Single   Tobacco Use   • Smoking status: Never   • Smokeless tobacco: Never   • Tobacco comments:     Never Smoked Never Chewed   Vaping Use   • Vaping Use: Never used   Substance and Sexual Activity   • Alcohol use: Never   • Drug use: Never   • Sexual activity: Never     Family History   Problem Relation Age of Onset   • Stroke Mother    • Hypertension Mother         BP been under control for over a year so med lowered   • Hyperlipidemia Mother    • Clotting disorder  "Mother    • Heart disease Mother         Heart valve pumps to fast leaky valve   • Rheum arthritis Father    • Hyperlipidemia Father    • Hypertension Father    • Hypertension Brother         His jumps up and down   • Hypothyroidism Brother    • Other Maternal Grandmother          at 20 years old with pneumonia   • Heart disease Maternal Grandfather    • Hyperlipidemia Maternal Grandfather            • Hypertension Maternal Grandfather            • Diabetes Maternal Grandfather    • Diabetes Paternal Grandmother    • Other Paternal Grandfather         history unknown   • Heart disease Brother    • Hypertension Brother         High blood pressure     Review of Systems   Constitutional: Positive for weight loss (-7). Negative for decreased appetite and malaise/fatigue.   HENT: Negative.    Eyes: Negative for blurred vision.   Cardiovascular: Positive for palpitations. Negative for chest pain, dyspnea on exertion, leg swelling and syncope.   Respiratory: Positive for sleep disturbances due to breathing (did not tolerate CPAP ). Negative for shortness of breath.    Endocrine: Negative.    Hematologic/Lymphatic: Negative for bleeding problem. Does not bruise/bleed easily.   Skin: Negative.    Musculoskeletal: Negative for falls and myalgias.   Gastrointestinal: Negative for abdominal pain, heartburn and melena.   Genitourinary: Negative for hematuria.   Neurological: Negative for dizziness and light-headedness.   Psychiatric/Behavioral: Negative for altered mental status.   Allergic/Immunologic: Negative.       Objective     /64 (BP Location: Right arm)   Pulse 64   Ht 140 cm (55.12\")   Wt 76.9 kg (169 lb 9.6 oz)   BMI 39.25 kg/m²     Vitals and nursing note reviewed.   Constitutional:       General: Not in acute distress.     Appearance: Well-developed. Not diaphoretic.   Eyes:      Pupils: Pupils are equal, round, and reactive to light.   HENT:      Head: Normocephalic.   Pulmonary:      " Effort: Pulmonary effort is normal. No respiratory distress.      Breath sounds: Normal breath sounds.   Cardiovascular:      Normal rate. Regular rhythm.   Pulses:     Intact distal pulses.   Edema:     Peripheral edema absent.   Abdominal:      General: Bowel sounds are normal.      Palpations: Abdomen is soft.   Musculoskeletal: Normal range of motion.      Cervical back: Normal range of motion. Skin:     General: Skin is warm and dry.   Neurological:      Mental Status: Alert and oriented to person, place, and time.        Procedures          Problem List Items Addressed This Visit        Cardiac and Vasculature    Palpitations    Bicuspid aortic valve - Primary    Overview     Echocardiogram, 2008, bicuspid AV            Endocrine and Metabolic    Hypothyroidism       Sleep    Sleep apnea in adult      1. Palpitations- rhythm regular today. Continue same plan, Inderal PRN.     2.  Bicuspid aortic valve- not well visualized on recent echo but appears to have no stenosis. Will continue to monitor. Repeat echo in one year or so.    3. Hypothyroidism- corrected, labs followed by PCP.     4. SABINE- unable to tolerate CPAP. Weight loss will help. She was congratulated on her efforts.     Class 2 Severe Obesity (BMI >=35 and <=39.9). Obesity-related health conditions include the following: obstructive sleep apnea, hypertension, coronary heart disease, diabetes mellitus, dyslipidemias and GERD. Obesity is improving with lifestyle modifications. BMI is is above average; BMI management plan is completed. We discussed portion control and increasing exercise.             Electronically signed by SNOW Lowery,  June 18, 2023 20:22 EDT

## 2023-06-14 NOTE — LETTER
June 18, 2023       No Recipients    Patient: Ryann Ortega   YOB: 1985   Date of Visit: 6/14/2023       Dear SNOW Carter    Ryann Ortega was in my office today. Below is a copy of my note.    If you have questions, please do not hesitate to call me. I look forward to following Ryann along with you.         Sincerely,        SNOW Lowery        CC:   No Recipients    Chief Complaint   Patient presents with   • Follow-up     Cardiac management   • Lab     Last labs a week ago per PCP.   • Palpitations     Has occasional flutter, not often.   • Med Refill     Does not need refills at this time.   • Sleep apnea     Unable to tolerate CPAP.     Subjective      Ryann Ortega is a 37 y.o. female with Down Syndrome who was seen in 2003 and 2008 for weakness and shortness of breath.  Echocardiogram showed normal LV function and possible bicuspid AV, intermittent prolapse.  Topamax was stopped and she had no recurrent syncope.  Overnight oximetry was abnormal but she was unable to tolerate CPAP.  She was diagnosed with hypothyroidism which has been corrected. She was referred back in July 2020 for increasing CP and SOB with exercise. Lexiscan showed no ischemia, normal LVEF. Echo showed no aortic stenosis but unable to fully visualize aortic valve. At January 2022 office visit Inderal was added on a as needed basis for increased heart rate. Echo repeated 9/2022 with normal LVEF, AV not well visualized but no stenosis noted.     She returns today for follow up with her mother. According to the mother, she does not complain of chest pain, dyspnea or palpitations at home. Today, she tells the nurse she sometimes feels her heart skipping. Has not used Inderal which is PRN. She has lost 7 more pounds.       Cardiac History:    Past Surgical History:   Procedure Laterality Date   • CARDIOVASCULAR STRESS TEST  07/14/2020    Lexiscan Poor Quality. . Breast Attenuation.   • ECHO - CONVERTED   04/15/2008    EF 60%. Bicuspid AV, Redudant MV, Mild MR. RVSP- 35 mmHg.   • ECHO - CONVERTED  07/14/2020    TLS. EF 55%. Unable to see the AV. Trace-Mild MR.   • ECHO - CONVERTED  09/06/2022    TLS. EF 65%. Trace-Mild MR     Current Outpatient Medications   Medication Sig Dispense Refill   • APPLE CIDER VINEGAR PO Take  by mouth 2 (Two) Times a Day.     • calcium carbonate (TUMS) 500 MG chewable tablet Chew 1 tablet Daily.     • cetirizine (zyrTEC) 10 MG tablet Take 1 tablet by mouth Daily.     • estradiol cypionate (DEPO-ESTRADIOL) 5 MG/ML injection Inject  into the appropriate muscle as directed by prescriber Every 28 (Twenty-Eight) Days.     • folic acid (FOLVITE) 1 MG tablet Take 1 tablet by mouth Daily.     • hydrOXYzine (ATARAX) 50 MG tablet Take 1 tablet by mouth Every Night.     • levothyroxine (SYNTHROID, LEVOTHROID) 100 MCG tablet Take 1 tablet by mouth Daily.     • Melatonin 10 MG tablet Take 1 tablet by mouth Every Night.     • multivitamin (THERAGRAN) tablet tablet Take 1 tablet by mouth Daily.     • pantoprazole (PROTONIX) 40 MG EC tablet Take 1 tablet by mouth Daily.     • propranolol (INDERAL) 10 MG tablet TAKE 1 TABLET BY MOUTH DAILY AS NEEDED FOR PALPITATIONS OR FAST HEART RATE 30 tablet 8   • vitamin B-12 (CYANOCOBALAMIN) 1000 MCG tablet Take 1 tablet by mouth Daily.       No current facility-administered medications for this visit.     Patient has no known allergies.    Past Medical History:   Diagnosis Date   • Anxiety and depression    • Asthma    • Down's syndrome    • Femur fracture, left     s/p surgical repair   • GERD (gastroesophageal reflux disease)    • Heart murmur    • History of tonsillectomy and adenoidectomy    • Hyperlipidemia    • Hypothyroidism    • Seasonal allergies    • Sleep apnea     noncompliant wearing mask   • Vitamin D deficiency      Social History     Socioeconomic History   • Marital status: Single   Tobacco Use   • Smoking status: Never   • Smokeless tobacco: Never    • Tobacco comments:     Never Smoked Never Chewed   Vaping Use   • Vaping Use: Never used   Substance and Sexual Activity   • Alcohol use: Never   • Drug use: Never   • Sexual activity: Never     Family History   Problem Relation Age of Onset   • Stroke Mother    • Hypertension Mother         BP been under control for over a year so med lowered   • Hyperlipidemia Mother    • Clotting disorder Mother    • Heart disease Mother         Heart valve pumps to fast leaky valve   • Rheum arthritis Father    • Hyperlipidemia Father    • Hypertension Father    • Hypertension Brother         His jumps up and down   • Hypothyroidism Brother    • Other Maternal Grandmother          at 20 years old with pneumonia   • Heart disease Maternal Grandfather    • Hyperlipidemia Maternal Grandfather            • Hypertension Maternal Grandfather            • Diabetes Maternal Grandfather    • Diabetes Paternal Grandmother    • Other Paternal Grandfather         history unknown   • Heart disease Brother    • Hypertension Brother         High blood pressure     Review of Systems   Constitutional: Positive for weight loss (-7). Negative for decreased appetite and malaise/fatigue.   HENT: Negative.    Eyes: Negative for blurred vision.   Cardiovascular: Positive for palpitations. Negative for chest pain, dyspnea on exertion, leg swelling and syncope.   Respiratory: Positive for sleep disturbances due to breathing (did not tolerate CPAP ). Negative for shortness of breath.    Endocrine: Negative.    Hematologic/Lymphatic: Negative for bleeding problem. Does not bruise/bleed easily.   Skin: Negative.    Musculoskeletal: Negative for falls and myalgias.   Gastrointestinal: Negative for abdominal pain, heartburn and melena.   Genitourinary: Negative for hematuria.   Neurological: Negative for dizziness and light-headedness.   Psychiatric/Behavioral: Negative for altered mental status.   Allergic/Immunologic: Negative.      "  Objective     /64 (BP Location: Right arm)   Pulse 64   Ht 140 cm (55.12\")   Wt 76.9 kg (169 lb 9.6 oz)   BMI 39.25 kg/m²     Vitals and nursing note reviewed.   Constitutional:       General: Not in acute distress.     Appearance: Well-developed. Not diaphoretic.   Eyes:      Pupils: Pupils are equal, round, and reactive to light.   HENT:      Head: Normocephalic.   Pulmonary:      Effort: Pulmonary effort is normal. No respiratory distress.      Breath sounds: Normal breath sounds.   Cardiovascular:      Normal rate. Regular rhythm.   Pulses:     Intact distal pulses.   Edema:     Peripheral edema absent.   Abdominal:      General: Bowel sounds are normal.      Palpations: Abdomen is soft.   Musculoskeletal: Normal range of motion.      Cervical back: Normal range of motion. Skin:     General: Skin is warm and dry.   Neurological:      Mental Status: Alert and oriented to person, place, and time.        Procedures         Problem List Items Addressed This Visit          Cardiac and Vasculature    Palpitations    Bicuspid aortic valve - Primary    Overview     Echocardiogram, 2008, bicuspid AV            Endocrine and Metabolic    Hypothyroidism       Sleep    Sleep apnea in adult      1. Palpitations- rhythm regular today. Continue same plan, Inderal PRN.     2.  Bicuspid aortic valve- not well visualized on recent echo but appears to have no stenosis. Will continue to monitor. Repeat echo in one year or so.    3. Hypothyroidism- corrected, labs followed by PCP.     4. SABINE- unable to tolerate CPAP. Weight loss will help. She was congratulated on her efforts.     Class 2 Severe Obesity (BMI >=35 and <=39.9). Obesity-related health conditions include the following: obstructive sleep apnea, hypertension, coronary heart disease, diabetes mellitus, dyslipidemias and GERD. Obesity is improving with lifestyle modifications. BMI is is above average; BMI management plan is completed. We discussed portion " control and increasing exercise.            Electronically signed by SNOW Lowery,  June 18, 2023 20:22 EDT

## 2024-01-08 ENCOUNTER — OFFICE VISIT (OUTPATIENT)
Dept: CARDIOLOGY | Facility: CLINIC | Age: 39
End: 2024-01-08
Payer: MEDICAID

## 2024-01-08 VITALS
HEART RATE: 72 BPM | BODY MASS INDEX: 37.63 KG/M2 | HEIGHT: 55 IN | WEIGHT: 162.6 LBS | SYSTOLIC BLOOD PRESSURE: 90 MMHG | DIASTOLIC BLOOD PRESSURE: 60 MMHG

## 2024-01-08 DIAGNOSIS — E03.9 HYPOTHYROIDISM, UNSPECIFIED TYPE: ICD-10-CM

## 2024-01-08 DIAGNOSIS — G47.30 SLEEP APNEA IN ADULT: ICD-10-CM

## 2024-01-08 DIAGNOSIS — R00.2 PALPITATIONS: Primary | ICD-10-CM

## 2024-01-08 DIAGNOSIS — Q23.1 BICUSPID AORTIC VALVE: ICD-10-CM

## 2024-01-08 PROCEDURE — 1159F MED LIST DOCD IN RCRD: CPT | Performed by: NURSE PRACTITIONER

## 2024-01-08 PROCEDURE — 1160F RVW MEDS BY RX/DR IN RCRD: CPT | Performed by: NURSE PRACTITIONER

## 2024-01-08 PROCEDURE — 99213 OFFICE O/P EST LOW 20 MIN: CPT | Performed by: NURSE PRACTITIONER

## 2024-01-08 RX ORDER — PROPRANOLOL HYDROCHLORIDE 10 MG/1
10 TABLET ORAL AS NEEDED
Qty: 30 TABLET | Refills: 8 | Status: SHIPPED | OUTPATIENT
Start: 2024-01-08

## 2024-01-08 RX ORDER — LEVOTHYROXINE SODIUM 0.15 MG/1
150 TABLET ORAL DAILY
COMMUNITY

## 2024-01-08 NOTE — PROGRESS NOTES
Chief Complaint   Patient presents with    Follow-up     Cardiac management    Lab     Last labs in October or November per PCP.    Med Refill     Needs refills on Propranolol-30 day.     Subjective       Ryann Ortega is a 38 y.o. female with Down Syndrome who was seen in 2003 and 2008 for weakness and shortness of breath.  Echocardiogram showed normal LV function and possible bicuspid AV, intermittent prolapse.  Topamax was stopped and she had no recurrent syncope.  Overnight oximetry was abnormal but she was unable to tolerate CPAP.  She was diagnosed with hypothyroidism which has been corrected. She was referred back in July 2020 for increasing CP and SOB with exercise. Lexiscan showed no ischemia, normal LVEF. Echo showed no aortic stenosis but unable to fully visualize aortic valve. At January 2022 office visit Inderal was added on a as needed basis for increased heart rate. Echo repeated 9/2022 with normal LVEF, AV not well visualized but no stenosis noted.      She returns today for follow up with her mother.  Ryann appears to be doing well from a cardiac standpoint.  She as well as her mother report no new symptoms or change in activities.  No significant palpitations, no observed dizziness or syncope.  TSH followed by PCP, now endocrinology.  On 9/9/2023 TSH elevated at 17.14, levothyroxine dose increased. She has lost 7 more pounds.          Cardiac History:    Past Surgical History:   Procedure Laterality Date    CARDIOVASCULAR STRESS TEST  07/14/2020    Lexiscan Poor Quality. . Breast Attenuation.    ECHO - CONVERTED  04/15/2008    EF 60%. Bicuspid AV, Redudant MV, Mild MR. RVSP- 35 mmHg.    ECHO - CONVERTED  07/14/2020    TLS. EF 55%. Unable to see the AV. Trace-Mild MR.    ECHO - CONVERTED  09/06/2022    TLS. EF 65%. Trace-Mild MR     Current Outpatient Medications   Medication Sig Dispense Refill    APPLE CIDER VINEGAR PO Take  by mouth 2 (Two) Times a Day.      calcium carbonate (TUMS) 500 MG  chewable tablet Chew 1 tablet Daily As Needed.      cetirizine (zyrTEC) 10 MG tablet Take 1 tablet by mouth Daily.      estradiol cypionate (DEPO-ESTRADIOL) 5 MG/ML injection Inject  into the appropriate muscle as directed by prescriber Every 28 (Twenty-Eight) Days.      folic acid (FOLVITE) 1 MG tablet Take 1 tablet by mouth Daily.      hydrOXYzine (ATARAX) 50 MG tablet Take 1 tablet by mouth Every Night.      levothyroxine (SYNTHROID, LEVOTHROID) 150 MCG tablet Take 1 tablet by mouth Daily.      Melatonin 10 MG tablet Take 1 tablet by mouth Every Night.      multivitamin (THERAGRAN) tablet tablet Take 1 tablet by mouth Daily.      pantoprazole (PROTONIX) 40 MG EC tablet Take 1 tablet by mouth Daily.      propranolol (INDERAL) 10 MG tablet Take 1 tablet by mouth As Needed (palpitations). 30 tablet 8    vitamin B-12 (CYANOCOBALAMIN) 1000 MCG tablet Take 1 tablet by mouth Daily.       No current facility-administered medications for this visit.     Patient has no known allergies.    Past Medical History:   Diagnosis Date    Anxiety and depression     Asthma     Down's syndrome     Femur fracture, left     s/p surgical repair    GERD (gastroesophageal reflux disease)     Heart murmur     History of tonsillectomy and adenoidectomy     Hyperlipidemia     Hypothyroidism     Seasonal allergies     Sleep apnea     noncompliant wearing mask    Vitamin D deficiency      Social History     Socioeconomic History    Marital status: Single   Tobacco Use    Smoking status: Never     Passive exposure: Never    Smokeless tobacco: Never    Tobacco comments:     Never Smoked Never Chewed   Vaping Use    Vaping Use: Never used   Substance and Sexual Activity    Alcohol use: Never    Drug use: Never    Sexual activity: Never     Family History   Problem Relation Age of Onset    Stroke Mother     Hypertension Mother         BP been under control for over a year so med lowered    Hyperlipidemia Mother     Clotting disorder Mother      "Heart disease Mother         Heart valve pumps to fast leaky valve    Rheum arthritis Father     Hyperlipidemia Father     Hypertension Father     Hypertension Brother         His jumps up and down    Hypothyroidism Brother     Other Maternal Grandmother          at 20 years old with pneumonia    Heart disease Maternal Grandfather     Hyperlipidemia Maternal Grandfather             Hypertension Maternal Grandfather             Diabetes Maternal Grandfather     Diabetes Paternal Grandmother     Other Paternal Grandfather         history unknown    Heart disease Brother     Hypertension Brother         High blood pressure     Review of Systems   Constitutional: Positive for weight loss (-7). Negative for decreased appetite and malaise/fatigue.   HENT: Negative.     Eyes:  Negative for blurred vision.   Cardiovascular:  Negative for chest pain, dyspnea on exertion, leg swelling, palpitations and syncope.   Respiratory:  Negative for shortness of breath and sleep disturbances due to breathing.    Endocrine: Negative.    Hematologic/Lymphatic: Negative for bleeding problem. Does not bruise/bleed easily.   Skin: Negative.    Musculoskeletal:  Negative for falls and myalgias.   Gastrointestinal:  Negative for abdominal pain, heartburn and melena.   Genitourinary:  Negative for hematuria.   Neurological:  Negative for dizziness and light-headedness.   Psychiatric/Behavioral:  Negative for altered mental status.    Allergic/Immunologic: Negative.       Objective     BP 90/60 (BP Location: Left arm)   Pulse 72   Ht 140 cm (55.12\")   Wt 73.8 kg (162 lb 9.6 oz)   BMI 37.63 kg/m²     Vitals and nursing note reviewed.   Constitutional:       General: Not in acute distress.     Appearance: Well-developed. Not diaphoretic.   Eyes:      Pupils: Pupils are equal, round, and reactive to light.   HENT:      Head: Normocephalic.   Pulmonary:      Effort: Pulmonary effort is normal. No respiratory distress.      " Breath sounds: Normal breath sounds.   Cardiovascular:      Normal rate. Regular rhythm.   Pulses:     Intact distal pulses.   Edema:     Peripheral edema absent.   Abdominal:      General: Bowel sounds are normal.      Palpations: Abdomen is soft.   Musculoskeletal: Normal range of motion.      Cervical back: Normal range of motion. Skin:     General: Skin is warm and dry.   Neurological:      Mental Status: Alert and oriented to person, place, and time.        Procedures          Problem List Items Addressed This Visit          Cardiac and Vasculature    Palpitations - Primary    Bicuspid aortic valve    Overview     Echocardiogram, 2008, bicuspid AV         Relevant Medications    propranolol (INDERAL) 10 MG tablet       Endocrine and Metabolic    Hypothyroidism    Relevant Medications    levothyroxine (SYNTHROID, LEVOTHROID) 150 MCG tablet    propranolol (INDERAL) 10 MG tablet       Sleep    Sleep apnea in adult      Palpitations  -Denied at this time  -Continue Inderal as needed, refill sent    Bicuspid aortic valve  -Not well-visualized on recent echo, no significant murmur noted on exam today  -Plan to repeat echocardiogram next year    Hypothyroidism  -TSH elevated at 17 with adjustment in levothyroxine  -Planning to see endocrinology    SABINE  -CPAP not tolerated  -Continue weight loss efforts    She appears stable from a cardiac standpoint.  No changes made.  Follow-up 6 months or sooner if needed.              Electronically signed by SNOW Lowery,  January 9, 2024 11:54 EST

## 2024-02-21 ENCOUNTER — OFFICE VISIT (OUTPATIENT)
Dept: ENDOCRINOLOGY | Facility: CLINIC | Age: 39
End: 2024-02-21
Payer: MEDICAID

## 2024-02-21 VITALS
OXYGEN SATURATION: 96 % | HEART RATE: 91 BPM | BODY MASS INDEX: 37.95 KG/M2 | DIASTOLIC BLOOD PRESSURE: 73 MMHG | WEIGHT: 164 LBS | SYSTOLIC BLOOD PRESSURE: 107 MMHG | HEIGHT: 55 IN

## 2024-02-21 DIAGNOSIS — E03.9 ACQUIRED HYPOTHYROIDISM: Primary | ICD-10-CM

## 2024-02-21 LAB
T4 FREE SERPL-MCNC: 1.84 NG/DL (ref 0.93–1.7)
TSH SERPL DL<=0.05 MIU/L-ACNC: 1.29 UIU/ML (ref 0.27–4.2)

## 2024-02-21 PROCEDURE — 84443 ASSAY THYROID STIM HORMONE: CPT | Performed by: NURSE PRACTITIONER

## 2024-02-21 PROCEDURE — 84439 ASSAY OF FREE THYROXINE: CPT | Performed by: NURSE PRACTITIONER

## 2024-02-21 NOTE — ASSESSMENT & PLAN NOTE
-Clinically euthyroid.  -TFT's today with medication adjustment accordingly.   -Reminded of proper administration including taking 7 pills per week on an empty stomach with no missed doses, waiting 30-60 minutes prior to other medications or food.  -Follow-up in 6 months.

## 2024-02-21 NOTE — PROGRESS NOTES
Chief Complaint   Patient presents with    Hypothyroidism        Referring Provider  Almita Coffman*     HPI   Ryann Ortega is a 38 y.o. female had concerns including Hypothyroidism.   Seen as a new patient.  Hypothyroidism.    She is currently taking T4 150 mcg QD.  She is taking this regularly without missing doses.  She denies any compressive s/sx's.  She denies any conflicting medication.    Birth state: [place]  Previous history of radiation to face/neck: none  Consuming foods high in iodine:none  Family history of thyroid complications: none, no hx of thyroid cancer    Labs: 09/2023  TSH: 17.14 H  Free T4: 1.1  TPO Antibody: 10 H    The following portions of the patient's history were reviewed and updated as appropriate: allergies, current medications, past family history, past medical history, past social history, past surgical history, and problem list.    Past Medical History:   Diagnosis Date    Anxiety and depression     Asthma     Down's syndrome     Femur fracture, left     s/p surgical repair    GERD (gastroesophageal reflux disease)     Heart murmur     History of tonsillectomy and adenoidectomy     Hyperlipidemia     Hypothyroidism     Seasonal allergies     Sleep apnea     noncompliant wearing mask    Vitamin D deficiency      Past Surgical History:   Procedure Laterality Date    CARDIOVASCULAR STRESS TEST  07/14/2020    Lexiscan Poor Quality. . Breast Attenuation.    ECHO - CONVERTED  04/15/2008    EF 60%. Bicuspid AV, Redudant MV, Mild MR. RVSP- 35 mmHg.    ECHO - CONVERTED  07/14/2020    TLS. EF 55%. Unable to see the AV. Trace-Mild MR.    ECHO - CONVERTED  09/06/2022    TLS. EF 65%. Trace-Mild MR      Family History   Problem Relation Age of Onset    Stroke Mother     Hypertension Mother         BP been under control for over a year so med lowered    Hyperlipidemia Mother     Clotting disorder Mother     Heart disease Mother         Heart valve pumps to fast leaky valve    Rheum  arthritis Father     Hyperlipidemia Father     Hypertension Father     Hypertension Brother         His jumps up and down    Hypothyroidism Brother     Other Maternal Grandmother          at 20 years old with pneumonia    Heart disease Maternal Grandfather     Hyperlipidemia Maternal Grandfather             Hypertension Maternal Grandfather             Diabetes Maternal Grandfather     Diabetes Paternal Grandmother     Other Paternal Grandfather         history unknown    Heart disease Brother     Hypertension Brother         High blood pressure      Social History     Socioeconomic History    Marital status: Single   Tobacco Use    Smoking status: Never     Passive exposure: Never    Smokeless tobacco: Never    Tobacco comments:     Never Smoked Never Chewed   Vaping Use    Vaping Use: Never used   Substance and Sexual Activity    Alcohol use: Never    Drug use: Never    Sexual activity: Never      No Known Allergies   Current Outpatient Medications on File Prior to Visit   Medication Sig Dispense Refill    APPLE CIDER VINEGAR PO Take  by mouth 2 (Two) Times a Day.      calcium carbonate (TUMS) 500 MG chewable tablet Chew 1 tablet Daily As Needed.      cetirizine (zyrTEC) 10 MG tablet Take 1 tablet by mouth Daily.      estradiol cypionate (DEPO-ESTRADIOL) 5 MG/ML injection Inject  into the appropriate muscle as directed by prescriber Every 28 (Twenty-Eight) Days.      folic acid (FOLVITE) 1 MG tablet Take 1 tablet by mouth Daily.      hydrOXYzine (ATARAX) 50 MG tablet Take 1 tablet by mouth Every Night.      levothyroxine (SYNTHROID, LEVOTHROID) 150 MCG tablet Take 1 tablet by mouth Daily.      Melatonin 10 MG tablet Take 1 tablet by mouth Every Night.      multivitamin (THERAGRAN) tablet tablet Take 1 tablet by mouth Daily.      pantoprazole (PROTONIX) 40 MG EC tablet Take 1 tablet by mouth Daily.      propranolol (INDERAL) 10 MG tablet Take 1 tablet by mouth As Needed (palpitations). 30 tablet  "8    vitamin B-12 (CYANOCOBALAMIN) 1000 MCG tablet Take 1 tablet by mouth Daily.       No current facility-administered medications on file prior to visit.      Review of Systems   Constitutional:  Positive for fatigue, unexpected weight gain and unexpected weight loss.   Eyes: Negative.    Endocrine: Negative.    Skin: Negative.    Psychiatric/Behavioral: Negative.     All other systems reviewed and are negative.    /73 (BP Location: Right arm, Patient Position: Sitting, Cuff Size: Adult)   Pulse 91   Ht 139.7 cm (55\")   Wt 74.4 kg (164 lb)   SpO2 96%   BMI 38.12 kg/m²      Physical Exam  Vitals reviewed.   Constitutional:       Appearance: Normal appearance.   Eyes:      Extraocular Movements: Extraocular movements intact.   Neck:      Thyroid: Thyromegaly present. No thyroid mass or thyroid tenderness.   Cardiovascular:      Rate and Rhythm: Tachycardia present.   Pulmonary:      Effort: Pulmonary effort is normal.   Neurological:      General: No focal deficit present.      Mental Status: She is alert and oriented to person, place, and time.   Psychiatric:         Mood and Affect: Mood normal.         Behavior: Behavior normal.         Thought Content: Thought content normal.         Judgment: Judgment normal.       Labs/Imaging  CMP  No results found for: \"GLUCOSE\", \"BUN\", \"CREATININE\", \"EGFRIFNONA\", \"EGFRIFAFRI\", \"BCR\", \"K\", \"CO2\", \"CALCIUM\", \"PROTENTOTREF\", \"ALBUMIN\", \"LABIL2\", \"BILIRUBIN\", \"AST\", \"ALT\"     CBC w/DIFF No results found for: \"WBC\", \"RBC\", \"HGB\", \"HCT\", \"MCV\", \"MCH\", \"MCHC\", \"RDW\", \"RDWSD\", \"MPV\", \"PLT\", \"NEUTRORELPCT\", \"LYMPHORELPCT\", \"MONORELPCT\", \"EOSRELPCT\", \"BASORELPCT\", \"AUTOIGPER\", \"NEUTROABS\", \"LYMPHSABS\", \"MONOSABS\", \"EOSABS\", \"BASOSABS\", \"AUTOIGNUM\", \"NRBC\"    TSH  No results found for: \"TSH\"    T4  No results found for: \"FREET4\"  No results found for: \"V8JLDBV\"    T3  No results found for: \"T3FREE\"  No results found for: \"P4WEEVW\"    TRAb  No results found for: " "\"TSURCPAB\"    TPO  No results found for: \"THYROIDAB\"    No valid procedures specified.    Assessment and Plan    Diagnoses and all orders for this visit:    1. Acquired hypothyroidism (Primary)  Assessment & Plan:  -Clinically euthyroid.  -TFT's today with medication adjustment accordingly.   -Reminded of proper administration including taking 7 pills per week on an empty stomach with no missed doses, waiting 30-60 minutes prior to other medications or food.  -Follow-up in 6 months.    Orders:  -     TSH  -     T4, free         Return in about 6 months (around 8/21/2024) for Follow-up appointment. The patient was instructed to contact the clinic with any interval questions or concerns.      This document has been electronically signed by SNOW Bear  February 21, 2024 13:30 EST   Endocrinology    Please note that portions of this document were completed with a voice recognition program. Efforts were made to edit the dictations, but occasionally words are mis-transcribed.   "

## 2024-05-13 ENCOUNTER — TELEPHONE (OUTPATIENT)
Dept: ENDOCRINOLOGY | Facility: CLINIC | Age: 39
End: 2024-05-13
Payer: MEDICAID

## 2024-05-13 NOTE — TELEPHONE ENCOUNTER
Patient's mom called and wanted to check on US Thyroid scheduling. I looked and don't see one ordered. Did you want one before her visit or after her visit in August?

## 2024-05-13 NOTE — TELEPHONE ENCOUNTER
I will order it when she comes at her appt in August, as it will need to be completed in September.

## 2024-07-22 ENCOUNTER — OFFICE VISIT (OUTPATIENT)
Dept: CARDIOLOGY | Facility: CLINIC | Age: 39
End: 2024-07-22
Payer: MEDICARE

## 2024-07-22 VITALS
DIASTOLIC BLOOD PRESSURE: 58 MMHG | HEIGHT: 55 IN | SYSTOLIC BLOOD PRESSURE: 88 MMHG | BODY MASS INDEX: 38.97 KG/M2 | WEIGHT: 168.4 LBS | HEART RATE: 68 BPM

## 2024-07-22 DIAGNOSIS — R00.2 PALPITATIONS: Primary | ICD-10-CM

## 2024-07-22 DIAGNOSIS — G47.30 SLEEP APNEA IN ADULT: ICD-10-CM

## 2024-07-22 DIAGNOSIS — Q23.1 BICUSPID AORTIC VALVE: ICD-10-CM

## 2024-07-22 DIAGNOSIS — E03.9 HYPOTHYROIDISM, UNSPECIFIED TYPE: ICD-10-CM

## 2024-07-22 PROCEDURE — 1160F RVW MEDS BY RX/DR IN RCRD: CPT | Performed by: NURSE PRACTITIONER

## 2024-07-22 PROCEDURE — 99213 OFFICE O/P EST LOW 20 MIN: CPT | Performed by: NURSE PRACTITIONER

## 2024-07-22 PROCEDURE — 1159F MED LIST DOCD IN RCRD: CPT | Performed by: NURSE PRACTITIONER

## 2024-07-22 RX ORDER — MONTELUKAST SODIUM 10 MG/1
10 TABLET ORAL NIGHTLY
COMMUNITY

## 2024-07-22 NOTE — PROGRESS NOTES
Chief Complaint   Patient presents with    Follow-up     Cardiac management    Lab     Last labs about a month ago per PCP.       Med Refill     Does not refills at this time. Has not had to take propranolol.     Subjective       Ryann Ortega is a 38 y.o. female with Down Syndrome who was seen in 2003 and 2008 for weakness and shortness of breath.  Echocardiogram showed normal LV function and possible bicuspid AV, intermittent prolapse.  Topamax was stopped and she had no recurrent syncope.  Overnight oximetry was abnormal but she was unable to tolerate CPAP.  She was diagnosed with hypothyroidism which has been corrected. She was referred back in July 2020 for increasing CP and SOB with exercise. Lexiscan showed no ischemia, normal LVEF. Echo showed no aortic stenosis but unable to fully visualize aortic valve. At January 2022 office visit Inderal was added on a as needed basis for increased heart rate. Echo repeated 9/2022 with normal LVEF, AV not well visualized but no stenosis noted.      She returns today for follow up with her mother. She is doing well. She denies cardiac symptoms. Her mother has not noticed any symptoms or changes in activities. She has mild heartburn/acid reflux at times. Has not required Inderal. Labs followed by PCP and endocrinology.        Cardiac History:    Past Surgical History:   Procedure Laterality Date    CARDIOVASCULAR STRESS TEST  07/14/2020    Lexiscan Poor Quality. . Breast Attenuation.    ECHO - CONVERTED  04/15/2008    EF 60%. Bicuspid AV, Redudant MV, Mild MR. RVSP- 35 mmHg.    ECHO - CONVERTED  07/14/2020    TLS. EF 55%. Unable to see the AV. Trace-Mild MR.    ECHO - CONVERTED  09/06/2022    TLS. EF 65%. Trace-Mild MR     Current Outpatient Medications   Medication Sig Dispense Refill    APPLE CIDER VINEGAR PO Take 2 tablets by mouth 2 (Two) Times a Day.      calcium carbonate (TUMS) 500 MG chewable tablet Chew 1 tablet Daily As Needed.      cetirizine (zyrTEC) 10  MG tablet Take 1 tablet by mouth Daily.      estradiol cypionate (DEPO-ESTRADIOL) 5 MG/ML injection Inject  into the appropriate muscle as directed by prescriber Every 28 (Twenty-Eight) Days.      folic acid (FOLVITE) 1 MG tablet Take 1 tablet by mouth Daily.      hydrOXYzine (ATARAX) 50 MG tablet Take 1 tablet by mouth Every Night.      levothyroxine (SYNTHROID, LEVOTHROID) 150 MCG tablet Take 1 tablet by mouth Daily.      Melatonin 10 MG tablet Take 24 mg by mouth Every Night.      montelukast (SINGULAIR) 10 MG tablet Take 1 tablet by mouth Every Night.      multivitamin (THERAGRAN) tablet tablet Take 1 tablet by mouth Daily.      pantoprazole (PROTONIX) 40 MG EC tablet Take 1 tablet by mouth 2 (Two) Times a Day.      propranolol (INDERAL) 10 MG tablet Take 1 tablet by mouth As Needed (palpitations). 30 tablet 8    vitamin B-12 (CYANOCOBALAMIN) 1000 MCG tablet Take 1 tablet by mouth Daily.       No current facility-administered medications for this visit.     Patient has no known allergies.    Past Medical History:   Diagnosis Date    Anxiety and depression     Asthma     Down's syndrome     Femur fracture, left     s/p surgical repair    GERD (gastroesophageal reflux disease)     Heart murmur     History of tonsillectomy and adenoidectomy     Hyperlipidemia     Hypothyroidism     Seasonal allergies     Sleep apnea     noncompliant wearing mask    Vitamin D deficiency      Social History     Socioeconomic History    Marital status: Single   Tobacco Use    Smoking status: Never     Passive exposure: Never    Smokeless tobacco: Never    Tobacco comments:     Never Smoked Never Chewed   Vaping Use    Vaping status: Never Used   Substance and Sexual Activity    Alcohol use: Never    Drug use: Never    Sexual activity: Never     Family History   Problem Relation Age of Onset    Stroke Mother     Hypertension Mother         BP been under control for over a year so med lowered    Hyperlipidemia Mother     Clotting  "disorder Mother     Heart disease Mother         Heart valve pumps to fast leaky valve    Rheum arthritis Father     Hyperlipidemia Father     Hypertension Father         BP under control    Hypertension Brother         His jumps up and down    Hypothyroidism Brother     Other Maternal Grandmother          at 20 years old with pneumonia    Heart disease Maternal Grandfather     Hyperlipidemia Maternal Grandfather             Hypertension Maternal Grandfather             Diabetes Maternal Grandfather     Diabetes Paternal Grandmother     Other Paternal Grandfather         history unknown    Heart disease Brother     Hypertension Brother         Has high blood pressure     Review of Systems   Constitutional: Positive for weight gain (4). Negative for decreased appetite and malaise/fatigue.   HENT: Negative.     Eyes:  Negative for blurred vision.   Cardiovascular:  Negative for chest pain, dyspnea on exertion, leg swelling, palpitations and syncope.   Respiratory:  Negative for shortness of breath and sleep disturbances due to breathing.    Endocrine: Negative.    Hematologic/Lymphatic: Negative for bleeding problem. Does not bruise/bleed easily.   Skin: Negative.    Musculoskeletal:  Negative for falls and myalgias.   Gastrointestinal:  Positive for heartburn. Negative for abdominal pain and melena.   Genitourinary:  Negative for hematuria.   Neurological:  Negative for dizziness and light-headedness.   Psychiatric/Behavioral:  Negative for altered mental status.    Allergic/Immunologic: Negative.       Objective     BP (!) 88/58 (BP Location: Right arm)   Pulse 68   Ht 140 cm (55.12\")   Wt 76.4 kg (168 lb 6.4 oz)   BMI 38.97 kg/m²     Vitals and nursing note reviewed.   Constitutional:       General: Not in acute distress.     Appearance: Well-developed. Not diaphoretic.   Eyes:      Pupils: Pupils are equal, round, and reactive to light.   HENT:      Head: Normocephalic.   Pulmonary:      " Effort: Pulmonary effort is normal. No respiratory distress.      Breath sounds: Normal breath sounds.   Cardiovascular:      Normal rate. Regular rhythm.      Murmurs: There is a grade 1/6 systolic murmur.   Pulses:     Intact distal pulses.   Edema:     Peripheral edema absent.   Abdominal:      General: Bowel sounds are normal.      Palpations: Abdomen is soft.   Musculoskeletal: Normal range of motion.      Cervical back: Normal range of motion. Skin:     General: Skin is warm and dry.   Neurological:      Mental Status: Alert and oriented to person, place, and time.        Procedures          Problem List Items Addressed This Visit          Cardiac and Vasculature    Palpitations - Primary    Bicuspid aortic valve    Overview     Echocardiogram, 2008, bicuspid AV            Endocrine and Metabolic    Hypothyroidism       Sleep    Sleep apnea in adult      Palpitations  -Denied at this time  -Continue Inderal as needed, refill sent     Bicuspid aortic valve  -Not well-visualized on recent echo, no significant murmur noted on exam today  -Plan to repeat echocardiogram next visit      Hypothyroidism  -TSH well controlled per pt family   -seeing endocrinology     SABINE  -CPAP not tolerated  -Continue weight loss efforts     She appears stable from a cardiac standpoint.  No changes made.  Plan to repeat echo next visit. Follow-up 6 months or sooner if needed.    Class 2 Severe Obesity (BMI >=35 and <=39.9). Obesity-related health conditions include the following: obstructive sleep apnea, hypertension, coronary heart disease, diabetes mellitus, dyslipidemias, and GERD. Obesity is improving with lifestyle modifications. BMI is is above average; BMI management plan is completed. We discussed portion control and increasing exercise.               Electronically signed by SNOW Lowery,  July 22, 2024 12:59 EDT

## 2024-08-21 ENCOUNTER — OFFICE VISIT (OUTPATIENT)
Dept: ENDOCRINOLOGY | Facility: CLINIC | Age: 39
End: 2024-08-21
Payer: MEDICARE

## 2024-08-21 VITALS
DIASTOLIC BLOOD PRESSURE: 60 MMHG | HEIGHT: 55 IN | SYSTOLIC BLOOD PRESSURE: 110 MMHG | WEIGHT: 169.4 LBS | BODY MASS INDEX: 39.2 KG/M2 | HEART RATE: 82 BPM | OXYGEN SATURATION: 98 %

## 2024-08-21 DIAGNOSIS — E03.9 ACQUIRED HYPOTHYROIDISM: Primary | ICD-10-CM

## 2024-08-21 LAB
T4 FREE SERPL-MCNC: 1.87 NG/DL (ref 0.92–1.68)
TSH SERPL DL<=0.05 MIU/L-ACNC: 0.28 UIU/ML (ref 0.27–4.2)

## 2024-08-21 PROCEDURE — 84439 ASSAY OF FREE THYROXINE: CPT | Performed by: NURSE PRACTITIONER

## 2024-08-21 PROCEDURE — 84443 ASSAY THYROID STIM HORMONE: CPT | Performed by: NURSE PRACTITIONER

## 2024-08-21 NOTE — PROGRESS NOTES
Chief Complaint   Patient presents with    Acquired hypothyroidism        Referring Provider  No ref. provider found     HPI   Ryann Ortega is a 38 y.o. female had concerns including Acquired hypothyroidism.   Hypothyroidism.    She is currently taking T4 150 mcg QD.  She is taking this regularly without missing doses.  She denies any compressive s/sx's.  She denies any conflicting medication.    Birth state: [place]  Previous history of radiation to face/neck: none  Consuming foods high in iodine:none  Family history of thyroid complications: none, no hx of thyroid cancer    Labs: 09/2023  TSH: 17.14 H  Free T4: 1.1  TPO Antibody: 10 H    The following portions of the patient's history were reviewed and updated as appropriate: allergies, current medications, past family history, past medical history, past social history, past surgical history, and problem list.    Past Medical History:   Diagnosis Date    Anxiety and depression     Asthma     Down's syndrome     Femur fracture, left     s/p surgical repair    GERD (gastroesophageal reflux disease)     Heart murmur     History of tonsillectomy and adenoidectomy     Hyperlipidemia     Hypothyroidism     Seasonal allergies     Sleep apnea     noncompliant wearing mask    Vitamin D deficiency      Past Surgical History:   Procedure Laterality Date    CARDIOVASCULAR STRESS TEST  07/14/2020    Lexiscan Poor Quality. . Breast Attenuation.    ECHO - CONVERTED  04/15/2008    EF 60%. Bicuspid AV, Redudant MV, Mild MR. RVSP- 35 mmHg.    ECHO - CONVERTED  07/14/2020    TLS. EF 55%. Unable to see the AV. Trace-Mild MR.    ECHO - CONVERTED  09/06/2022    TLS. EF 65%. Trace-Mild MR      Family History   Problem Relation Age of Onset    Stroke Mother     Hypertension Mother         BP been under control for over a year so med lowered    Hyperlipidemia Mother     Clotting disorder Mother     Heart disease Mother         Heart valve pumps to fast leaky valve    Rheum arthritis  Father     Hyperlipidemia Father     Hypertension Father         BP under control    Hypertension Brother         His jumps up and down    Hypothyroidism Brother     Other Maternal Grandmother          at 20 years old with pneumonia    Heart disease Maternal Grandfather     Hyperlipidemia Maternal Grandfather             Hypertension Maternal Grandfather             Diabetes Maternal Grandfather     Diabetes Paternal Grandmother     Other Paternal Grandfather         history unknown    Heart disease Brother     Hypertension Brother         Has high blood pressure      Social History     Socioeconomic History    Marital status: Single   Tobacco Use    Smoking status: Never     Passive exposure: Never    Smokeless tobacco: Never    Tobacco comments:     Never Smoked Never Chewed   Vaping Use    Vaping status: Never Used   Substance and Sexual Activity    Alcohol use: Never    Drug use: Never    Sexual activity: Never      No Known Allergies   Current Outpatient Medications on File Prior to Visit   Medication Sig Dispense Refill    APPLE CIDER VINEGAR PO Take 2 tablets by mouth 2 (Two) Times a Day.      calcium carbonate (TUMS) 500 MG chewable tablet Chew 1 tablet Daily As Needed.      cetirizine (zyrTEC) 10 MG tablet Take 1 tablet by mouth Daily.      estradiol cypionate (DEPO-ESTRADIOL) 5 MG/ML injection Inject  into the appropriate muscle as directed by prescriber Every 28 (Twenty-Eight) Days.      folic acid (FOLVITE) 1 MG tablet Take 1 tablet by mouth Daily.      hydrOXYzine (ATARAX) 50 MG tablet Take 1 tablet by mouth Every Night.      levothyroxine (SYNTHROID, LEVOTHROID) 150 MCG tablet Take 1 tablet by mouth Daily.      Melatonin 10 MG tablet Take 24 mg by mouth Every Night.      montelukast (SINGULAIR) 10 MG tablet Take 1 tablet by mouth Every Night.      multivitamin (THERAGRAN) tablet tablet Take 1 tablet by mouth Daily.      pantoprazole (PROTONIX) 40 MG EC tablet Take 1 tablet by mouth  "2 (Two) Times a Day.      propranolol (INDERAL) 10 MG tablet Take 1 tablet by mouth As Needed (palpitations). 30 tablet 8    vitamin B-12 (CYANOCOBALAMIN) 1000 MCG tablet Take 1 tablet by mouth Daily.       No current facility-administered medications on file prior to visit.      Review of Systems   Constitutional:  Positive for fatigue, unexpected weight gain and unexpected weight loss.   Eyes: Negative.    Endocrine: Negative.    Skin: Negative.    Psychiatric/Behavioral: Negative.     All other systems reviewed and are negative.    /60 (BP Location: Left arm, Patient Position: Sitting, Cuff Size: Large Adult)   Pulse 82   Ht 139.7 cm (55\")   Wt 76.8 kg (169 lb 6.4 oz)   SpO2 98%   BMI 39.37 kg/m²      Physical Exam  Vitals reviewed.   Constitutional:       Appearance: Normal appearance.   Eyes:      Extraocular Movements: Extraocular movements intact.   Neck:      Thyroid: Thyromegaly present. No thyroid mass or thyroid tenderness.   Cardiovascular:      Rate and Rhythm: Normal rate.   Pulmonary:      Effort: Pulmonary effort is normal.   Neurological:      General: No focal deficit present.      Mental Status: She is alert and oriented to person, place, and time.   Psychiatric:         Mood and Affect: Mood normal.         Behavior: Behavior normal.         Thought Content: Thought content normal.         Judgment: Judgment normal.       Labs/Imaging  CMP  No results found for: \"GLUCOSE\", \"BUN\", \"CREATININE\", \"EGFRIFNONA\", \"EGFRIFAFRI\", \"BCR\", \"K\", \"CO2\", \"CALCIUM\", \"PROTENTOTREF\", \"ALBUMIN\", \"LABIL2\", \"BILIRUBIN\", \"AST\", \"ALT\"     CBC w/DIFF No results found for: \"WBC\", \"RBC\", \"HGB\", \"HCT\", \"MCV\", \"MCH\", \"MCHC\", \"RDW\", \"RDWSD\", \"MPV\", \"PLT\", \"NEUTRORELPCT\", \"LYMPHORELPCT\", \"MONORELPCT\", \"EOSRELPCT\", \"BASORELPCT\", \"AUTOIGPER\", \"NEUTROABS\", \"LYMPHSABS\", \"MONOSABS\", \"EOSABS\", \"BASOSABS\", \"AUTOIGNUM\", \"NRBC\"    TSH  Lab Results   Component Value Date    TSH 1.290 02/21/2024       T4  Lab Results " "  Component Value Date    FREET4 1.84 (H) 02/21/2024     No results found for: \"M6TCZEH\"    T3  No results found for: \"T3FREE\"  No results found for: \"Q1MUSGA\"    TRAb  No results found for: \"TSURCPAB\"    TPO  No results found for: \"THYROIDAB\"    No valid procedures specified.    Assessment and Plan    Diagnoses and all orders for this visit:    1. Acquired hypothyroidism (Primary)  Assessment & Plan:  -Clinically euthyroid.  -TFT's today with medication adjustment accordingly.   -Reminded of proper administration including taking 7 pills per week on an empty stomach with no missed doses, waiting 30-60 minutes prior to other medications or food.  -Follow-up in 6 months.    Orders:  -     TSH  -     T4, free           Return in about 6 months (around 2/21/2025) for Follow-up appointment. The patient was instructed to contact the clinic with any interval questions or concerns.      This document has been electronically signed by SNOW Bear  August 21, 2024 14:55 EDT   Endocrinology    Please note that portions of this document were completed with a voice recognition program. Efforts were made to edit the dictations, but occasionally words are mis-transcribed.   "

## 2024-08-22 RX ORDER — LEVOTHYROXINE SODIUM 137 UG/1
137 TABLET ORAL DAILY
Qty: 90 TABLET | Refills: 1 | Status: SHIPPED | OUTPATIENT
Start: 2024-08-22

## 2024-12-16 RX ORDER — PROPRANOLOL HYDROCHLORIDE 10 MG/1
TABLET ORAL
Qty: 30 TABLET | Refills: 8 | Status: SHIPPED | OUTPATIENT
Start: 2024-12-16

## 2025-01-28 ENCOUNTER — OFFICE VISIT (OUTPATIENT)
Dept: CARDIOLOGY | Facility: CLINIC | Age: 40
End: 2025-01-28
Payer: MEDICARE

## 2025-01-28 VITALS
DIASTOLIC BLOOD PRESSURE: 54 MMHG | WEIGHT: 171.2 LBS | BODY MASS INDEX: 39.62 KG/M2 | SYSTOLIC BLOOD PRESSURE: 102 MMHG | HEART RATE: 60 BPM | HEIGHT: 55 IN

## 2025-01-28 DIAGNOSIS — R00.2 PALPITATIONS: ICD-10-CM

## 2025-01-28 DIAGNOSIS — G47.30 SLEEP APNEA IN ADULT: ICD-10-CM

## 2025-01-28 DIAGNOSIS — Q23.81 BICUSPID AORTIC VALVE: Primary | ICD-10-CM

## 2025-01-28 PROCEDURE — 1160F RVW MEDS BY RX/DR IN RCRD: CPT | Performed by: NURSE PRACTITIONER

## 2025-01-28 PROCEDURE — 99214 OFFICE O/P EST MOD 30 MIN: CPT | Performed by: NURSE PRACTITIONER

## 2025-01-28 PROCEDURE — 1159F MED LIST DOCD IN RCRD: CPT | Performed by: NURSE PRACTITIONER

## 2025-01-28 RX ORDER — ERGOCALCIFEROL 1.25 MG/1
50000 CAPSULE, LIQUID FILLED ORAL WEEKLY
COMMUNITY

## 2025-01-28 NOTE — PROGRESS NOTES
Chief Complaint   Patient presents with    Follow-up     Cardiac management    Lab     Last labs 11/28/24 per PCP, see PCP note.    Palpitations     Has occasional, not often.    Med Refill     Does not need refills at this time.     Subjective       Ryann Ortega is a 39 y.o. female with Down Syndrome who was seen in 2003 and 2008 for weakness and shortness of breath.  Echocardiogram showed normal LV function and possible bicuspid AV, intermittent prolapse.  Topamax was stopped and she had no recurrent syncope.  Overnight oximetry was abnormal but she was unable to tolerate CPAP.  She was diagnosed with hypothyroidism which has been corrected. She was referred back in July 2020 for increasing CP and SOB with exercise. Lexiscan showed no ischemia, normal LVEF. Echo showed no aortic stenosis but unable to fully visualize aortic valve. At January 2022 office visit Inderal was added on a as needed basis for increased heart rate. Echo repeated 9/2022 with normal LVEF, AV not well visualized but no stenosis noted.      She returns today for follow up with her mother. She is doing well. She has occasional palpitations. No anginal chest pain or unexplained dyspnea. Her mother has not noticed any new symptoms or changes in behaviors. She has occasional mild heartburn. Has not required Inderal. Labs followed by PCP and endocrinology. On 11/21/2024: B12 858, H/H15.5/44.8, platelets 252, , K4.2, glucose 88, BUN/CR 11/0.78, , normal LFT, A1c 4.6%, , , HDL 45, .  TSH 2.26, vitamin D 30.       Cardiac History:    Past Surgical History:   Procedure Laterality Date    CARDIOVASCULAR STRESS TEST  07/14/2020    Lexiscan Poor Quality. . Breast Attenuation.    ECHO - CONVERTED  04/15/2008    EF 60%. Bicuspid AV, Redudant MV, Mild MR. RVSP- 35 mmHg.    ECHO - CONVERTED  07/14/2020    TLS. EF 55%. Unable to see the AV. Trace-Mild MR.    ECHO - CONVERTED  09/06/2022    TLS. EF 65%. Trace-Mild MR     ECHO - CONVERTED  01/30/2025    TLS. EF 65%. Trace-Mild MR. RVSP- 12 mmHg     Current Outpatient Medications   Medication Sig Dispense Refill    APPLE CIDER VINEGAR PO Take 1 tablet by mouth Daily.      calcium carbonate (TUMS) 500 MG chewable tablet Chew 1 tablet Daily As Needed.      cetirizine (zyrTEC) 10 MG tablet Take 1 tablet by mouth Daily.      estradiol cypionate (DEPO-ESTRADIOL) 5 MG/ML injection Inject  into the appropriate muscle as directed by prescriber Every 3 (Three) Months.      folic acid (FOLVITE) 1 MG tablet Take 1 tablet by mouth Daily.      hydrOXYzine (ATARAX) 50 MG tablet Take 1 tablet by mouth Every Night.      levothyroxine (SYNTHROID, LEVOTHROID) 137 MCG tablet Take 1 tablet by mouth Daily. 90 tablet 1    Melatonin 10 MG tablet Take 12 mg by mouth Every Night.      montelukast (SINGULAIR) 10 MG tablet Take 1 tablet by mouth Every Night.      multivitamin (THERAGRAN) tablet tablet Take 1 tablet by mouth Daily.      pantoprazole (PROTONIX) 40 MG EC tablet Take 1 tablet by mouth Daily.      propranolol (INDERAL) 10 MG tablet TAKE 1 TABLET BY MOUTH DAILY AS NEEDED FOR PALPITATIONS 30 tablet 8    vitamin B-12 (CYANOCOBALAMIN) 1000 MCG tablet Take 1 tablet by mouth Daily.      vitamin D (ERGOCALCIFEROL) 1.25 MG (88496 UT) capsule capsule Take 1 capsule by mouth 1 (One) Time Per Week.       No current facility-administered medications for this visit.     Patient has no known allergies.    Past Medical History:   Diagnosis Date    Anxiety and depression     Asthma     Down's syndrome     Femur fracture, left     s/p surgical repair    GERD (gastroesophageal reflux disease)     Heart murmur     History of tonsillectomy and adenoidectomy     Hyperlipidemia     Hypothyroidism     Seasonal allergies     Sleep apnea     noncompliant wearing mask    Vitamin D deficiency      Social History     Socioeconomic History    Marital status: Single   Tobacco Use    Smoking status: Never     Passive exposure:  Never    Smokeless tobacco: Never    Tobacco comments:     Never Smoked Never Chewed   Vaping Use    Vaping status: Never Used   Substance and Sexual Activity    Alcohol use: Never    Drug use: Never    Sexual activity: Never     Family History   Problem Relation Age of Onset    Stroke Mother     Hypertension Mother         BP been under control for over a year so med lowered    Hyperlipidemia Mother     Clotting disorder Mother     Heart disease Mother         Heart valve pumps to fast leaky valve    Rheum arthritis Father     Hyperlipidemia Father     Hypertension Father         BP under control    Hypertension Brother         His jumps up and down    Hypothyroidism Brother     Other Maternal Grandmother          at 20 years old with pneumonia    Heart disease Maternal Grandfather     Hyperlipidemia Maternal Grandfather             Hypertension Maternal Grandfather             Diabetes Maternal Grandfather     Diabetes Paternal Grandmother     Other Paternal Grandfather         history unknown    Heart disease Brother     Hypertension Brother         Has high blood pressure     Review of Systems   Constitutional: Positive for weight gain (+2). Negative for decreased appetite and malaise/fatigue.   HENT: Negative.     Eyes:  Negative for blurred vision.   Cardiovascular:  Positive for palpitations. Negative for chest pain, dyspnea on exertion, leg swelling and syncope.   Respiratory:  Negative for shortness of breath and sleep disturbances due to breathing.    Endocrine: Negative.    Hematologic/Lymphatic: Negative for bleeding problem. Does not bruise/bleed easily.   Skin: Negative.    Musculoskeletal:  Negative for falls and myalgias.   Gastrointestinal:  Negative for abdominal pain, heartburn and melena.   Genitourinary:  Negative for hematuria.   Neurological:  Negative for dizziness and light-headedness.   Psychiatric/Behavioral:  Negative for altered mental status.    Allergic/Immunologic:  "Negative.       Objective     /54 (BP Location: Right arm, Patient Position: Sitting)   Pulse 60   Ht 139.7 cm (55\")   Wt 77.7 kg (171 lb 3.2 oz)   BMI 39.79 kg/m²     Vitals and nursing note reviewed.   Constitutional:       General: Not in acute distress.     Appearance: Well-developed. Not diaphoretic.   Eyes:      Pupils: Pupils are equal, round, and reactive to light.   HENT:      Head: Normocephalic.   Pulmonary:      Effort: Pulmonary effort is normal. No respiratory distress.      Breath sounds: Normal breath sounds.   Cardiovascular:      Normal rate. Regular rhythm.   Pulses:     Intact distal pulses.   Edema:     Peripheral edema absent.   Abdominal:      General: Bowel sounds are normal.      Palpations: Abdomen is soft.   Musculoskeletal: Normal range of motion.      Cervical back: Normal range of motion. Skin:     General: Skin is warm and dry.   Neurological:      Mental Status: Alert and oriented to person, place, and time.        Procedures          Problem List Items Addressed This Visit          Cardiac and Vasculature    Palpitations    Relevant Orders    Adult Transthoracic Echo Complete W/ Cont if Necessary Per Protocol (Completed)    Bicuspid aortic valve - Primary    Overview     Echocardiogram, 2008, bicuspid AV         Relevant Orders    Adult Transthoracic Echo Complete W/ Cont if Necessary Per Protocol (Completed)       Sleep    Sleep apnea in adult    Relevant Orders    Adult Transthoracic Echo Complete W/ Cont if Necessary Per Protocol (Completed)       Palpitations  -very occasional, has not required beta blocker   -Continue Inderal as needed, refill sent     Bicuspid aortic valve  -Not well-visualized on echo, no significant murmur noted on exam today  -will repeat echocardiogram to evaluate AV and MV       Hypothyroidism  -TSH well controlled per pt family   -followed by endocrinology     SABINE  -CPAP not tolerated  -Continue weight loss efforts     She appears stable from a " cardiac standpoint.  No changes made.  Further recommendations to follow echo. Follow-up 6 months or sooner if needed.            Electronically signed by SNOW Lowery,  January 31, 2025 10:52 EST

## 2025-01-30 ENCOUNTER — HOSPITAL ENCOUNTER (OUTPATIENT)
Dept: CARDIOLOGY | Facility: HOSPITAL | Age: 40
Discharge: HOME | End: 2025-01-30
Admitting: NURSE PRACTITIONER
Payer: MEDICARE

## 2025-01-30 VITALS — BODY MASS INDEX: 39.64 KG/M2 | WEIGHT: 171.3 LBS | HEIGHT: 55 IN

## 2025-01-30 DIAGNOSIS — Q23.81 BICUSPID AORTIC VALVE: ICD-10-CM

## 2025-01-30 DIAGNOSIS — G47.30 SLEEP APNEA IN ADULT: ICD-10-CM

## 2025-01-30 DIAGNOSIS — R00.2 PALPITATIONS: ICD-10-CM

## 2025-01-30 LAB
AORTIC DIMENSIONLESS INDEX: 0.86 (DI)
AV MEAN PRESS GRAD SYS DOP V1V2: 2.22 MMHG
AV VMAX SYS DOP: 100.5 CM/SEC
BH CV ECHO MEAS - AO MAX PG: 4 MMHG
BH CV ECHO MEAS - AO ROOT DIAM: 2.8 CM
BH CV ECHO MEAS - AO V2 VTI: 20.8 CM
BH CV ECHO MEAS - AVA(I,D): 3 CM2
BH CV ECHO MEAS - EDV(CUBED): 63.6 ML
BH CV ECHO MEAS - ESV(CUBED): 21.2 ML
BH CV ECHO MEAS - FS: 30.6 %
BH CV ECHO MEAS - IVS/LVPW: 0.78 CM
BH CV ECHO MEAS - IVSD: 0.67 CM
BH CV ECHO MEAS - LA DIMENSION: 2.9 CM
BH CV ECHO MEAS - LAT PEAK E' VEL: 9.5 CM/SEC
BH CV ECHO MEAS - LV MASS(C)D: 86.6 GRAMS
BH CV ECHO MEAS - LV MAX PG: 3.3 MMHG
BH CV ECHO MEAS - LV MEAN PG: 1.35 MMHG
BH CV ECHO MEAS - LV V1 MAX: 90.6 CM/SEC
BH CV ECHO MEAS - LV V1 VTI: 17.9 CM
BH CV ECHO MEAS - LVIDD: 4 CM
BH CV ECHO MEAS - LVIDS: 2.8 CM
BH CV ECHO MEAS - LVOT AREA: 3.5 CM2
BH CV ECHO MEAS - LVOT DIAM: 2.11 CM
BH CV ECHO MEAS - LVPWD: 0.85 CM
BH CV ECHO MEAS - MED PEAK E' VEL: 10.1 CM/SEC
BH CV ECHO MEAS - MV A MAX VEL: 46.7 CM/SEC
BH CV ECHO MEAS - MV DEC SLOPE: 685.3 CM/SEC2
BH CV ECHO MEAS - MV DEC TIME: 0.21 SEC
BH CV ECHO MEAS - MV E MAX VEL: 85.7 CM/SEC
BH CV ECHO MEAS - MV E/A: 1.84
BH CV ECHO MEAS - MV MAX PG: 4 MMHG
BH CV ECHO MEAS - MV MEAN PG: 1.75 MMHG
BH CV ECHO MEAS - MV P1/2T: 43.8 MSEC
BH CV ECHO MEAS - MV V2 VTI: 26.6 CM
BH CV ECHO MEAS - MVA(P1/2T): 5 CM2
BH CV ECHO MEAS - MVA(VTI): 2.34 CM2
BH CV ECHO MEAS - PA V2 MAX: 96.6 CM/SEC
BH CV ECHO MEAS - RAP SYSTOLE: 8 MMHG
BH CV ECHO MEAS - RV MAX PG: 2.25 MMHG
BH CV ECHO MEAS - RV V1 MAX: 75 CM/SEC
BH CV ECHO MEAS - RV V1 VTI: 17.5 CM
BH CV ECHO MEAS - RVSP: 12.1 MMHG
BH CV ECHO MEAS - SV(LVOT): 62.2 ML
BH CV ECHO MEAS - SVI(LVOT): 37.5 ML/M2
BH CV ECHO MEAS - TAPSE (>1.6): 2.6 CM
BH CV ECHO MEAS - TR MAX PG: 4.1 MMHG
BH CV ECHO MEAS - TR MAX VEL: 101.4 CM/SEC
BH CV ECHO MEASUREMENTS AVERAGE E/E' RATIO: 8.74
BH CV XLRA - TDI S': 10.6 CM/SEC
LV EF 2D ECHO EST: 58 %
SINUS: 2.6 CM

## 2025-01-30 PROCEDURE — 93306 TTE W/DOPPLER COMPLETE: CPT

## 2025-02-24 ENCOUNTER — OFFICE VISIT (OUTPATIENT)
Dept: ENDOCRINOLOGY | Facility: CLINIC | Age: 40
End: 2025-02-24
Payer: MEDICARE

## 2025-02-24 VITALS
DIASTOLIC BLOOD PRESSURE: 70 MMHG | OXYGEN SATURATION: 96 % | BODY MASS INDEX: 39.57 KG/M2 | HEART RATE: 88 BPM | SYSTOLIC BLOOD PRESSURE: 122 MMHG | WEIGHT: 171 LBS

## 2025-02-24 DIAGNOSIS — E03.9 ACQUIRED HYPOTHYROIDISM: Primary | ICD-10-CM

## 2025-02-24 LAB
T4 FREE SERPL-MCNC: 1.79 NG/DL (ref 0.92–1.68)
TSH SERPL DL<=0.05 MIU/L-ACNC: 1.72 UIU/ML (ref 0.27–4.2)

## 2025-02-24 PROCEDURE — 1159F MED LIST DOCD IN RCRD: CPT | Performed by: NURSE PRACTITIONER

## 2025-02-24 PROCEDURE — 99213 OFFICE O/P EST LOW 20 MIN: CPT | Performed by: NURSE PRACTITIONER

## 2025-02-24 PROCEDURE — 84443 ASSAY THYROID STIM HORMONE: CPT | Performed by: NURSE PRACTITIONER

## 2025-02-24 PROCEDURE — 84439 ASSAY OF FREE THYROXINE: CPT | Performed by: NURSE PRACTITIONER

## 2025-02-24 PROCEDURE — 1160F RVW MEDS BY RX/DR IN RCRD: CPT | Performed by: NURSE PRACTITIONER

## 2025-02-24 NOTE — PROGRESS NOTES
Chief Complaint   Patient presents with    Follow-up     Hypothyroidism         Referring Provider  No ref. provider found     HPI   Ryann Ortega is a 39 y.o. female had concerns including Follow-up (Hypothyroidism ).   Hypothyroidism.    She is currently taking T4 137 mcg QD.  She is taking this regularly without missing doses.  She denies any compressive s/sx's.  She denies any conflicting medication. She denies any changes to her health since her last visit.    Birth state: [place]  Previous history of radiation to face/neck: none  Consuming foods high in iodine:none  Family history of thyroid complications: none, no hx of thyroid cancer    Labs: 09/2023  TSH: 17.14 H  Free T4: 1.1  TPO Antibody: 10 H    The following portions of the patient's history were reviewed and updated as appropriate: allergies, current medications, past family history, past medical history, past social history, past surgical history, and problem list.    Past Medical History:   Diagnosis Date    Anxiety and depression     Asthma     Down's syndrome     Femur fracture, left     s/p surgical repair    GERD (gastroesophageal reflux disease)     Heart murmur     History of tonsillectomy and adenoidectomy     Hyperlipidemia     Hypothyroidism     Seasonal allergies     Sleep apnea     noncompliant wearing mask    Vitamin D deficiency      Past Surgical History:   Procedure Laterality Date    CARDIOVASCULAR STRESS TEST  07/14/2020    Lexiscan Poor Quality. . Breast Attenuation.    ECHO - CONVERTED  04/15/2008    EF 60%. Bicuspid AV, Redudant MV, Mild MR. RVSP- 35 mmHg.    ECHO - CONVERTED  07/14/2020    TLS. EF 55%. Unable to see the AV. Trace-Mild MR.    ECHO - CONVERTED  09/06/2022    TLS. EF 65%. Trace-Mild MR    ECHO - CONVERTED  01/30/2025    TLS. EF 65%. Trace-Mild MR. RVSP- 12 mmHg      Family History   Problem Relation Age of Onset    Stroke Mother     Hypertension Mother         BP been under control for over a year so med lowered     Hyperlipidemia Mother     Clotting disorder Mother     Heart disease Mother         Heart valve pumps to fast leaky valve    Rheum arthritis Father     Hyperlipidemia Father     Hypertension Father         BP under control    Diabetes Father     Hypertension Brother         His jumps up and down    Hypothyroidism Brother     Heart disease Maternal Grandfather     Hyperlipidemia Maternal Grandfather             Hypertension Maternal Grandfather             Diabetes Maternal Grandfather     Diabetes Paternal Grandmother     Heart disease Brother     Diabetes Brother     Hypertension Brother         Has high blood pressure      Social History     Socioeconomic History    Marital status: Single   Tobacco Use    Smoking status: Never     Passive exposure: Never    Smokeless tobacco: Never    Tobacco comments:     Never Smoked Never Chewed   Vaping Use    Vaping status: Never Used   Substance and Sexual Activity    Alcohol use: Never    Drug use: Never    Sexual activity: Never      No Known Allergies   Current Outpatient Medications on File Prior to Visit   Medication Sig Dispense Refill    APPLE CIDER VINEGAR PO Take 1 tablet by mouth Daily.      calcium carbonate (TUMS) 500 MG chewable tablet Chew 1 tablet Daily As Needed.      cetirizine (zyrTEC) 10 MG tablet Take 1 tablet by mouth Daily.      estradiol cypionate (DEPO-ESTRADIOL) 5 MG/ML injection Inject  into the appropriate muscle as directed by prescriber Every 3 (Three) Months.      folic acid (FOLVITE) 1 MG tablet Take 1 tablet by mouth Daily.      hydrOXYzine (ATARAX) 50 MG tablet Take 1 tablet by mouth Every Night.      levothyroxine (SYNTHROID, LEVOTHROID) 137 MCG tablet Take 1 tablet by mouth Daily. 90 tablet 1    Melatonin 10 MG tablet Take 12 mg by mouth Every Night.      montelukast (SINGULAIR) 10 MG tablet Take 1 tablet by mouth Every Night.      multivitamin (THERAGRAN) tablet tablet Take 1 tablet by mouth Daily.      pantoprazole  "(PROTONIX) 40 MG EC tablet Take 1 tablet by mouth Daily.      propranolol (INDERAL) 10 MG tablet TAKE 1 TABLET BY MOUTH DAILY AS NEEDED FOR PALPITATIONS 30 tablet 8    vitamin B-12 (CYANOCOBALAMIN) 1000 MCG tablet Take 1 tablet by mouth Daily.      vitamin D (ERGOCALCIFEROL) 1.25 MG (75122 UT) capsule capsule Take 1 capsule by mouth 1 (One) Time Per Week.       No current facility-administered medications on file prior to visit.      Review of Systems   Constitutional:  Positive for fatigue, unexpected weight gain and unexpected weight loss.   Eyes: Negative.    Endocrine: Negative.    Skin: Negative.    Psychiatric/Behavioral: Negative.     All other systems reviewed and are negative.    /70 (BP Location: Left arm, Patient Position: Sitting, Cuff Size: Adult)   Pulse 88   Wt 77.6 kg (171 lb)   SpO2 96%   BMI 39.57 kg/m²      Physical Exam  Vitals reviewed.   Constitutional:       Appearance: Normal appearance.   Eyes:      Extraocular Movements: Extraocular movements intact.   Neck:      Thyroid: Thyromegaly present. No thyroid mass or thyroid tenderness.   Cardiovascular:      Rate and Rhythm: Normal rate.   Pulmonary:      Effort: Pulmonary effort is normal.   Neurological:      General: No focal deficit present.      Mental Status: She is alert and oriented to person, place, and time.   Psychiatric:         Mood and Affect: Mood normal.         Behavior: Behavior normal.         Thought Content: Thought content normal.         Judgment: Judgment normal.       Labs/Imaging  CMP  No results found for: \"GLUCOSE\", \"BUN\", \"CREATININE\", \"EGFRIFNONA\", \"EGFRIFAFRI\", \"BCR\", \"K\", \"CO2\", \"CALCIUM\", \"PROTENTOTREF\", \"ALBUMIN\", \"LABIL2\", \"BILIRUBIN\", \"AST\", \"ALT\"     CBC w/DIFF No results found for: \"WBC\", \"RBC\", \"HGB\", \"HCT\", \"MCV\", \"MCH\", \"MCHC\", \"RDW\", \"RDWSD\", \"MPV\", \"PLT\", \"NEUTRORELPCT\", \"LYMPHORELPCT\", \"MONORELPCT\", \"EOSRELPCT\", \"BASORELPCT\", \"AUTOIGPER\", \"NEUTROABS\", \"LYMPHSABS\", \"MONOSABS\", \"EOSABS\", " "\"BASOSABS\", \"AUTOIGNUM\", \"NRBC\"    TSH  Lab Results   Component Value Date    TSH 0.285 08/21/2024    TSH 1.290 02/21/2024       T4  Lab Results   Component Value Date    FREET4 1.87 (H) 08/21/2024    FREET4 1.84 (H) 02/21/2024     No results found for: \"Y8ZPEBU\"    T3  No results found for: \"T3FREE\"  No results found for: \"T1JDNCX\"    TRAb  No results found for: \"TSURCPAB\"    TPO  No results found for: \"THYROIDAB\"    No valid procedures specified.    Assessment and Plan    Diagnoses and all orders for this visit:    1. Acquired hypothyroidism (Primary)  Assessment & Plan:  -Clinically euthyroid.  -TFT's today with medication adjustment accordingly.   -Reminded of proper administration including taking 7 pills per week on an empty stomach with no missed doses, waiting 30-60 minutes prior to other medications or food.  -Will obtain US Thyroid.   -Follow-up in 1 year or sooner as indicated.     Orders:  -     TSH  -     T4, free  -     US Thyroid        Return in about 1 year (around 2/24/2026) for Follow-up appointment. The patient was instructed to contact the clinic with any interval questions or concerns.      This document has been electronically signed by SNOW Bear  February 24, 2025 13:13 EST   Endocrinology    Please note that portions of this document were completed with a voice recognition program. Efforts were made to edit the dictations, but occasionally words are mis-transcribed.   "

## 2025-02-24 NOTE — ASSESSMENT & PLAN NOTE
-Clinically euthyroid.  -TFT's today with medication adjustment accordingly.   -Reminded of proper administration including taking 7 pills per week on an empty stomach with no missed doses, waiting 30-60 minutes prior to other medications or food.  -Will obtain US Thyroid.   -Follow-up in 1 year or sooner as indicated.

## 2025-02-25 RX ORDER — LEVOTHYROXINE SODIUM 137 UG/1
137 TABLET ORAL DAILY
Qty: 90 TABLET | Refills: 1 | Status: SHIPPED | OUTPATIENT
Start: 2025-02-25

## 2025-03-26 ENCOUNTER — HOSPITAL ENCOUNTER (OUTPATIENT)
Dept: ULTRASOUND IMAGING | Facility: HOSPITAL | Age: 40
Discharge: HOME | End: 2025-03-26
Admitting: NURSE PRACTITIONER
Payer: MEDICARE

## 2025-03-26 PROCEDURE — 76536 US EXAM OF HEAD AND NECK: CPT

## 2025-03-26 PROCEDURE — 76536 US EXAM OF HEAD AND NECK: CPT | Performed by: RADIOLOGY

## 2025-08-04 ENCOUNTER — OFFICE VISIT (OUTPATIENT)
Dept: CARDIOLOGY | Facility: CLINIC | Age: 40
End: 2025-08-04
Payer: MEDICARE

## 2025-08-04 VITALS
WEIGHT: 177.6 LBS | DIASTOLIC BLOOD PRESSURE: 54 MMHG | SYSTOLIC BLOOD PRESSURE: 82 MMHG | HEIGHT: 55 IN | BODY MASS INDEX: 41.1 KG/M2 | HEART RATE: 64 BPM

## 2025-08-04 DIAGNOSIS — E03.9 HYPOTHYROIDISM, UNSPECIFIED TYPE: ICD-10-CM

## 2025-08-04 DIAGNOSIS — Q23.81 BICUSPID AORTIC VALVE: ICD-10-CM

## 2025-08-04 DIAGNOSIS — G47.30 SLEEP APNEA IN ADULT: ICD-10-CM

## 2025-08-04 DIAGNOSIS — R00.2 PALPITATIONS: Primary | ICD-10-CM

## 2025-08-04 PROCEDURE — 1160F RVW MEDS BY RX/DR IN RCRD: CPT | Performed by: NURSE PRACTITIONER

## 2025-08-04 PROCEDURE — 1159F MED LIST DOCD IN RCRD: CPT | Performed by: NURSE PRACTITIONER

## 2025-08-04 PROCEDURE — 99214 OFFICE O/P EST MOD 30 MIN: CPT | Performed by: NURSE PRACTITIONER

## 2025-08-04 RX ORDER — FLUOXETINE 10 MG/1
10 CAPSULE ORAL DAILY
COMMUNITY